# Patient Record
Sex: MALE | Race: BLACK OR AFRICAN AMERICAN | NOT HISPANIC OR LATINO | Employment: OTHER | URBAN - METROPOLITAN AREA
[De-identification: names, ages, dates, MRNs, and addresses within clinical notes are randomized per-mention and may not be internally consistent; named-entity substitution may affect disease eponyms.]

---

## 2023-08-14 ENCOUNTER — HOSPITAL ENCOUNTER (OUTPATIENT)
Dept: DATA CONVERSION | Facility: HOSPITAL | Age: 63
Discharge: HOME | End: 2023-08-14

## 2023-08-14 DIAGNOSIS — M54.50 LOW BACK PAIN, UNSPECIFIED: ICD-10-CM

## 2023-08-28 LAB
INR IN PPP BY COAGULATION ASSAY EXTERNAL: 2.1
PROTHROMBIN TIME (PT) IN PPP BY COAGULATION ASSAY EXTERNAL: NORMAL SECONDS

## 2023-09-27 ENCOUNTER — TRANSCRIBE ORDERS (OUTPATIENT)
Dept: ENDOCRINOLOGY | Facility: CLINIC | Age: 63
End: 2023-09-27
Payer: MEDICARE

## 2023-09-28 DIAGNOSIS — I82.4Y9 DEEP VEIN THROMBOSIS (DVT) OF PROXIMAL LOWER EXTREMITY, UNSPECIFIED CHRONICITY, UNSPECIFIED LATERALITY (MULTI): Primary | ICD-10-CM

## 2023-09-28 PROBLEM — I82.409 DVT OF LEG (DEEP VENOUS THROMBOSIS) (MULTI): Status: ACTIVE | Noted: 2023-09-28

## 2023-10-02 ENCOUNTER — CLINICAL SUPPORT (OUTPATIENT)
Dept: PRIMARY CARE | Facility: HOSPITAL | Age: 63
End: 2023-10-02
Payer: MEDICARE

## 2023-10-02 DIAGNOSIS — I82.4Y9 DEEP VEIN THROMBOSIS (DVT) OF PROXIMAL LOWER EXTREMITY, UNSPECIFIED CHRONICITY, UNSPECIFIED LATERALITY (MULTI): ICD-10-CM

## 2023-10-02 LAB
POC INR: 1.9
POC PROTHROMBIN TIME: NORMAL

## 2023-10-02 PROCEDURE — 99211 OFF/OP EST MAY X REQ PHY/QHP: CPT | Performed by: NURSE PRACTITIONER

## 2023-10-02 PROCEDURE — 85610 PROTHROMBIN TIME: CPT | Performed by: NURSE PRACTITIONER

## 2023-10-02 NOTE — PROGRESS NOTES
Patient identification verified with 2 identifiers.    Location: [facility] - 1st Floor Anticoagulation Clinic [address] [place], [place] [address]    Referring Physician: Dr Koo  Enrollment/ Re-enrollment date: 10/2/24   INR Goal: 2.0-3.0  INR monitoring is per [facility] protocol.  Anticoagulation Medication: warfarin  Indication: DVT    Subjective   Bleeding signs/symptoms: No    Bruising: No   Major bleeding event: No  Thrombosis signs/symptoms: No  Thromboembolic event: No  Missed doses: No  Extra doses: No  Medication changes: No  Dietary changes: No  Change in health: No  Change in activity: No  Alcohol: No  Other concerns: No    Upcoming Surgeries:  Does the Patient Have any upcoming surgeries that require interruption in anticoagulation therapy? no  Does the patient require bridging? no      Anticoagulation Summary  As of 10/2/2023      INR goal:  2.0-3.0   TTR:  --   INR used for dosin.90 (10/2/2023)   Weekly warfarin total:  38 mg               Assessment/Plan   Subtherapeutic     1. New dose: no change    2. Next INR: 1 month  Patient will take 6 mg (one time dose) on Friday since he thinks he did not take  dose correctly      Education provided to patient during the visit:  Patient instructed to call in interim with questions, concerns and changes.

## 2023-10-24 ENCOUNTER — NUTRITION (OUTPATIENT)
Dept: PRIMARY CARE | Facility: CLINIC | Age: 63
End: 2023-10-24
Payer: MEDICARE

## 2023-10-24 DIAGNOSIS — E66.01 CLASS 3 SEVERE OBESITY DUE TO EXCESS CALORIES IN ADULT, UNSPECIFIED BMI, UNSPECIFIED WHETHER SERIOUS COMORBIDITY PRESENT (MULTI): Primary | ICD-10-CM

## 2023-10-24 PROCEDURE — 97802 MEDICAL NUTRITION INDIV IN: CPT

## 2023-10-24 NOTE — PROGRESS NOTES
"Nutrition: Initial Assessment    Reason for Nutrition Visit: Patient is a 63 y.o. male referred for wt mgmt. Referred on 10/2/23 by Dr. Albright.     Subjective: Would like to feel more mobile and less fatigued. He is on Warfarin. In the past, he lost wt w/ [employer]. Says he frequently skips meals.    Food and Nutrition History  Allergies: None  Intolerance: None  Appetite: Fluctuates  GI Symptoms : None  Swallowing Difficulty: No problems with swallowing  Dentition : own  Food Preparation: Patient and Partner/Spouse  Cooking Skills/Barriers: None reported  Grocery Shopping: Patient  Supplements: Denies   Sleep disorders: obstructive sleep apnea, no CPAP  Food Insecurity: Denies    24 Hour Diet Recall  Meal 1: hotdog, grits  Meal 2: piece of chicken, handful of chips, water  Meal 3: polish boy on Sunday night     Snacks: smoothies w/ protein, chips   Beverages: water, lemonade, occ pop   Eating out: His wife picks up fast food on her way home from work.   Alcohol Intake: occ beer    Physical Activity: No formal exercise; mowing lawn and chores     Anthropometrics  Ht Readings from Last 1 Encounters:   08/11/23 1.727 m (5' 8\")     BMI Readings from Last 1 Encounters:   08/11/23 50.51 kg/m²     Wt Readings from Last 10 Encounters:   08/11/23 (!) 151 kg (332 lb 3.2 oz)   06/23/23 150 kg (330 lb)   05/04/23 149 kg (329 lb)   12/15/22 147 kg (325 lb)   10/06/22 143 kg (315 lb)   09/14/22 (!) 141 kg (310 lb)   08/15/22 (!) 141 kg (311 lb)   07/05/22 142 kg (313 lb 6.4 oz)   04/26/22 138 kg (304 lb)   02/15/22 130 kg (286 lb)     Weight Changes: Wt gain of 5% (17#) x 1 year    Labs  Low vitamin D = 19 (6/26/23)     Nutrition Focused Physical Exam:  Performed/Deferred: Deferred as pt visually appears well-nourished with no signs of malnutrition    Past Medical History  Patient Active Problem List   Diagnosis    DVT of leg (deep venous thrombosis) (CMS/[facility])        Estimated Nutrition Needs  Estimated Energy Needs: " 1800 kcals/day  Method for Calculating: MSJ 2121 x 1.1 - 500 (for weight loss)    Estimated Protein Needs: 90 grams/day  Method for Calculatin.6 g/kg/d    Nutrition Diagnosis:  Diagnosis Statement 1:  Diagnosis Status: New  Diagnosis : Undesirable food choices related to patient's uncertainty of what foods to be eating for a balanced diet as evidenced by  high intake of processed foods and high sodium foods and low intake of fruits/veg, whole grains, lean proteins.       Nutrition Interventions:  Food/Nutrient Delivery: Decreased Sodium Diet and Mediterranean Diet  Nutrition Counseling: Goal Setting  Coordination of Care: Patient would like to see Sleep Medicine; Will contact Dr. Albright for referral.   Nutrition Education:   Discussed starting an interdisciplinary weight management program that occurs in a small group setting and includes dietary interventions, physical activity discussions, and goal setting.  Discussed incorporating more fiber in his diet through whole food sources; Recommended increasing non-starchy vegetables and reviewed sources. Discussed limiting sources high in vitamin K.  Provided education on benefits of walking / light physical activity.   Recommended patient start OTC vitamin D3 5000 UT once daily from a USP certified brand.   *Patient voiced understanding of the education provided and denied any additional questions/concerns.    Educational Handouts: Mediterranean Meal Plan Booklet    Nutrition Goals:  Consistent meal / snack pattern  Vitamin D levels WNL  Intentional weight loss of 0.5-2 lb per week, trending toward a clinically significant weight loss of 5-10% of current body weight       S.M.A.R.T. Goals:   Aim to walk 10 minutes per day, 5-6 days per week.   Aim to eat 1 serving of a non-starchy vegetable 5-6 days per week.     Readiness to Change : Excellent  Level of Understanding : Excellent  Anticipated Compliant : Excellent      Follow-up: Patient is welcome to  follow-up at any time. To schedule, please call [phone].

## 2023-10-31 ENCOUNTER — OFFICE VISIT (OUTPATIENT)
Dept: SLEEP MEDICINE | Facility: CLINIC | Age: 63
End: 2023-10-31
Payer: MEDICARE

## 2023-10-31 VITALS
SYSTOLIC BLOOD PRESSURE: 104 MMHG | HEART RATE: 81 BPM | DIASTOLIC BLOOD PRESSURE: 82 MMHG | OXYGEN SATURATION: 97 % | WEIGHT: 315 LBS | BODY MASS INDEX: 47.74 KG/M2 | HEIGHT: 68 IN

## 2023-10-31 DIAGNOSIS — E66.01 CLASS 3 SEVERE OBESITY DUE TO EXCESS CALORIES IN ADULT, UNSPECIFIED BMI, UNSPECIFIED WHETHER SERIOUS COMORBIDITY PRESENT (MULTI): ICD-10-CM

## 2023-10-31 DIAGNOSIS — G47.33 OSA (OBSTRUCTIVE SLEEP APNEA): Primary | ICD-10-CM

## 2023-10-31 PROCEDURE — 3008F BODY MASS INDEX DOCD: CPT | Performed by: PHYSICIAN ASSISTANT

## 2023-10-31 PROCEDURE — 99215 OFFICE O/P EST HI 40 MIN: CPT | Performed by: PHYSICIAN ASSISTANT

## 2023-10-31 PROCEDURE — 1036F TOBACCO NON-USER: CPT | Performed by: PHYSICIAN ASSISTANT

## 2023-10-31 RX ORDER — LISINOPRIL AND HYDROCHLOROTHIAZIDE 12.5; 2 MG/1; MG/1
TABLET ORAL
COMMUNITY
Start: 2017-02-07 | End: 2024-04-17 | Stop reason: WASHOUT

## 2023-10-31 RX ORDER — ASPIRIN 325 MG
1 TABLET, DELAYED RELEASE (ENTERIC COATED) ORAL WEEKLY
COMMUNITY
Start: 2023-07-07 | End: 2023-11-30 | Stop reason: WASHOUT

## 2023-10-31 RX ORDER — FUROSEMIDE 40 MG/1
TABLET ORAL EVERY 24 HOURS
COMMUNITY
Start: 2021-02-08 | End: 2023-11-24 | Stop reason: SDUPTHER

## 2023-10-31 RX ORDER — METOPROLOL TARTRATE 25 MG/1
1 TABLET, FILM COATED ORAL EVERY 12 HOURS
COMMUNITY
Start: 2015-11-30

## 2023-10-31 RX ORDER — TADALAFIL 10 MG/1
TABLET ORAL
COMMUNITY
Start: 2014-10-06 | End: 2023-11-30 | Stop reason: WASHOUT

## 2023-10-31 RX ORDER — LOVASTATIN 20 MG/1
TABLET ORAL EVERY 24 HOURS
COMMUNITY
Start: 2023-06-20 | End: 2023-11-30 | Stop reason: SDUPTHER

## 2023-10-31 RX ORDER — ATENOLOL 25 MG/1
1 TABLET ORAL DAILY
COMMUNITY
End: 2024-01-24 | Stop reason: ALTCHOICE

## 2023-10-31 RX ORDER — AMIODARONE HYDROCHLORIDE 200 MG/1
TABLET ORAL EVERY 24 HOURS
COMMUNITY
Start: 2018-07-02

## 2023-10-31 RX ORDER — WARFARIN 2 MG/1
TABLET ORAL
COMMUNITY

## 2023-10-31 ASSESSMENT — PAIN SCALES - GENERAL: PAINLEVEL: 0-NO PAIN

## 2023-10-31 NOTE — PATIENT INSTRUCTIONS
Thank you for coming to the Sleep Medicine Clinic today! Your sleep medicine provider today was: Denton Holland PA-C Below is a summary of your treatment plan, other important information, and our contact numbers:      TREATMENT PLAN     Oral Appliance Therapy (also known as Mandibular Advancement Device): After talking about several sleep apnea treatment options, we have decided that an oral appliance is a reasonable treatment option. Oral appliance therapy is a dental device that can be fabricated, customized, and adjusted by a dental sleep medicine specialist.  You need to contact a dentist who is comfortable with making oral appliances for sleep apnea. You should consider a dentist who also specialized in sleep medicine. Below are the names of individuals that we commonly refer patients to or are known as dental sleep medicine specialists.    After your oral appliance is made and adjusted (~3-6 months), we would like to make sure that the oral appliance is treating your sleep apnea effectively. Please return to my clinic at that time for follow up and testing.     Dr. Raza Serrato  /[facility]: [phone]    Dr. Tez Devi  [facility]   [phone]    Dr. Nolan Wheatley  [place], [place]: [phone]    Dr. Abelino Gay  [place], [place]: [phone]    Dr. Farhad [place], [place]: [phone]    Dr. Fabio New  [place], [place]: [phone]    [facility]:  Dr. Vega Taylor    You can also check the American Academy of Dental Sleep Medicine for additional recommendation of dentists that make oral appliances at: https://mms.aadsm.org/members/directory/search_bootstrap.php?org_id=ADSM.    Oral appliance therapy is typically covered by your medical insurance as sleep apnea is a medical diagnoses and not a dental diagnosis. You can confirm coverage by calling your medical insurance and providing them with the following medical codes:    Diagnosis code: Obstructive Sleep  "Apnea G47.33  Procedure code: V78841      Follow-up Appointment:   After evaluation for oral advancement device      IMPORTANT PHONE NUMBERS     Sleep Medicine Clinic Fax: [phone]  Appointments (for Adult Sleep Clinic): [phone] - option 2  Appointments (For Sleep Studies): [phone] - option 3  Behavioral Sleep Medicine: [phone]    [facility] ([facility]): [phone]  For clinical questions and refilling prescriptions: [phone]  Manisha Perez (For Melissa/Amalia): P: [phone]  F: [phone]       CONTACTING YOUR SLEEP MEDICINE PROVIDER     Send a message directly to your provider through \"My Chart\", which is the email service through your  Records Account: [email or web address]   Call [phone] and leave a message. One of the administrative assistants will forward the message to your sleep medicine provider through \"My Chart\" and/or email.     Your sleep medicine provider for this visit was: Denton Holland PA-C    "

## 2023-10-31 NOTE — PROGRESS NOTES
"[facility]  Followup Visit Note    HISTORY OF PRESENT ILLNESS   Fernando Nation is a 63 y.o. male who presents to a [facility] for followup.     The patient  has a past medical history of Heart disease, unspecified, Other headache syndrome, Personal history of other diseases of the circulatory system, Personal history of other diseases of the circulatory system, Personal history of other diseases of the digestive system, Personal history of other diseases of the musculoskeletal system and connective tissue, Personal history of other drug therapy, Personal history of other endocrine, nutritional and metabolic disease, Personal history of other endocrine, nutritional and metabolic disease, Personal history of other endocrine, nutritional and metabolic disease, Personal history of other endocrine, nutritional and metabolic disease, Personal history of other specified conditions, Personal history of pneumonia (recurrent), Personal history of pulmonary embolism, and Presence of automatic (implantable) cardiac defibrillator..      Current History    On today's visit, the patient reports he is interested in an oral appliance (mandibular advancement device).     Has not been able to tolerate CPAP/biPAP. He felt with both the CPAP and BiPAP he had palpitations. States it \"forced my heart to beat in a different direction\". He has a defibrillator in his chest.    Even prior to these issues he had difficulty tolerating CPAP. Never really felt it helped significantly.    Symptoms of VENECIA include daytime sleepiness, morning headaches, elevated BP. Snores, stops breathing, HTN.    He is a side sleeper. No caffeine. ESS is 11 today.    PAP Adherence:  Durable Medical Equipment Company: [employer]  No recent use    Sleep Scales:  ESS: No data recorded     REVIEW OF SYSTEMS    All other systems negative      ALLERGIES AND MEDICATIONS     ALLERGIES  No Known " Allergies    MEDICATIONS: He has a current medication list which includes the following prescription(s): amiodarone - once every 24 hours, atenolol - Take 1 tablet (25 mg) by mouth once daily, cholecalciferol - Take 1 capsule (50,000 Units) by mouth once a week, furosemide - once every 24 hours, lisinopril-hydrochlorothiazide - Take by mouth, lovastatin - once every 24 hours, metoprolol tartrate - Take 1 tablet (25 mg) by mouth every 12 hours, tadalafil - Take 1 tablet by mouth prior to sexual activity, and warfarin - as directed po once a day/ except for 6 mg on monday and Fridays.    PAST MEDICAL HISTORY : He  has a past medical history of Heart disease, unspecified, Other headache syndrome, Personal history of other diseases of the circulatory system, Personal history of other diseases of the circulatory system, Personal history of other diseases of the digestive system, Personal history of other diseases of the musculoskeletal system and connective tissue, Personal history of other drug therapy, Personal history of other endocrine, nutritional and metabolic disease, Personal history of other endocrine, nutritional and metabolic disease, Personal history of other endocrine, nutritional and metabolic disease, Personal history of other endocrine, nutritional and metabolic disease, Personal history of other specified conditions, Personal history of pneumonia (recurrent), Personal history of pulmonary embolism, and Presence of automatic (implantable) cardiac defibrillator.    PAST SURGICAL HISTORY: He  has a past surgical history that includes Other surgical history (10/03/2016); Colonoscopy (02/07/2017); Other surgical history (08/15/2022); and Other surgical history (08/15/2022).     FAMILY HISTORY: No changes since previous visit. Otherwise non-contributory as charted.       SOCIAL HISTORY  He  reports that he has never smoked. He has never used smokeless tobacco. He reports that he does not currently use alcohol.  "He reports that he does not use drugs.       PHYSICAL EXAM     VITAL SIGNS: /82   Pulse 81   Ht 1.727 m (5' 8\")   Wt 147 kg (325 lb)   SpO2 97%   BMI 49.42 kg/m²      CURRENT WEIGHT: [unfilled]  BMI: [unfilled]  PREVIOUS WEIGHTS:  Wt Readings from Last 3 Encounters:   10/31/23 147 kg (325 lb)   08/11/23 (!) 151 kg (332 lb 3.2 oz)   06/23/23 150 kg (330 lb)       Constitutional: Alert and oriented, cooperative, no obvious distress   HEENT: Non icteric or anemic, EOM WNL bilaterally   Neck: Supple, no JVD, no goiter, no adenopathy, no rigidity  Extremities: No clubbing, no LL edema   Neuromuscular: Cranial nerves grossly intact, no focal deficits     Mallampati 4+  Bower 4+  macroglossia  Lateral wall 2+    RESULTS/DATA     Iron (UG/DL)   Date Value   12/04/2021 70     Iron Saturation (%)   Date Value   12/04/2021 45.2     TIBC (UG/DL)   Date Value   12/04/2021 155 (L)         ASSESSMENT/PLAN     Mr. Nation is a 63 y.o. male and returns in followup for the following issues:    OBSTRUCTIVE SLEEP APNEA  -Provided list of dentists who perform OAT  -He is not interested in titration study to evaluate ideal PAP settings  -Recommend side sleeping, avoiding supine sleep  -Also discussed weight loss would likely reduce severity of VENECIA and in combination with positional therapy and OAT would likely manage VENECIA well  -Goal of OAT is better BP control, less sleepiness, less waking at night, lower CVD risk    OBESITY  -BMI 49 TODAY  -Has been seeing nutritionist with goal of losing weight  -Understands relationship between weight and VENECIA    HYPERTENSION  -/82 today  -Well controlled with current medications    He will call preferred dentist for further evaluation regarding oral appliance therapy    Follow up after starting OAT         "

## 2023-11-01 ENCOUNTER — OFFICE VISIT (OUTPATIENT)
Dept: ENDOCRINOLOGY | Facility: CLINIC | Age: 63
End: 2023-11-01
Payer: MEDICARE

## 2023-11-01 VITALS
DIASTOLIC BLOOD PRESSURE: 64 MMHG | HEIGHT: 68 IN | HEART RATE: 87 BPM | BODY MASS INDEX: 47.74 KG/M2 | WEIGHT: 315 LBS | TEMPERATURE: 97 F | SYSTOLIC BLOOD PRESSURE: 100 MMHG

## 2023-11-01 DIAGNOSIS — E66.8 CONSTITUTIONAL OBESITY: ICD-10-CM

## 2023-11-01 DIAGNOSIS — Z76.89 ENCOUNTER FOR WEIGHT MANAGEMENT: ICD-10-CM

## 2023-11-01 PROCEDURE — 3008F BODY MASS INDEX DOCD: CPT | Performed by: INTERNAL MEDICINE

## 2023-11-01 PROCEDURE — 99215 OFFICE O/P EST HI 40 MIN: CPT | Performed by: INTERNAL MEDICINE

## 2023-11-01 PROCEDURE — 1036F TOBACCO NON-USER: CPT | Performed by: INTERNAL MEDICINE

## 2023-11-01 NOTE — PROGRESS NOTES
Subjective   Fernando Nation is a 63 y.o. male with a hx of afib, Hypertrophic cardiomyopathy (goes to [facility]), defibillator, VENECIA, HTN, HLD, Vtach who presents for weight management and obesity.  No Glaucoma, (+) remote history of kidney stones.     weight has always been an issue for him.  over the last year he gained 12lb.     1. Nutrition: Has not modified anything in diet. Eating more baked chicken.     2. Sleep: Has VENECIA      3. Stress: Managed      4. Exercise: walked a couple times for 10 minutes. Not currently exercising.      5. Appetite control: controlled  AOM currently taking: No    6. Goal: 10 minute walk every other day       Current Outpatient Medications:     amiodarone (Pacerone) 200 mg tablet, once every 24 hours., Disp: , Rfl:     atenolol (Tenormin) 25 mg tablet, Take 1 tablet (25 mg) by mouth once daily., Disp: , Rfl:     cholecalciferol (Vitamin D-3) 50,000 unit capsule, Take 1 capsule (50,000 Units) by mouth once a week., Disp: , Rfl:     furosemide (Lasix) 40 mg tablet, once every 24 hours., Disp: , Rfl:     lisinopriL-hydrochlorothiazide 20-12.5 mg tablet, Take by mouth., Disp: , Rfl:     lovastatin (Mevacor) 20 mg tablet, once every 24 hours., Disp: , Rfl:     metoprolol tartrate (Lopressor) 25 mg tablet, Take 1 tablet (25 mg) by mouth every 12 hours., Disp: , Rfl:     tadalafil (Cialis) 10 mg tablet, Take 1 tablet by mouth prior to sexual activity, Disp: , Rfl:     warfarin (Coumadin) 2 mg tablet, as directed po once a day/ except for 6 mg on monday and Fridays, Disp: , Rfl:     ROS:  System: normal  Eyes : no visual changes  Neck : no tenderness, no new lumps/bumps  Respiratory : no SOB  Cardiovascular : no chest pain, no palpitations  Gastro-Intestinal : no abdominal concerns  Neurological : no numbness or tingling in the extremities  Musculoskeletal : no joint paint, no muscle pain  Skin : no unusual rashes  Psychiatric : no depression, no anxiety  See HPI for Endocrine ROS    Past Medical  History:   Diagnosis Date    Heart disease, unspecified     Systolic dysfunction    Other headache syndrome     Headache syndrome    Personal history of other diseases of the circulatory system     History of cardiac disorder    Personal history of other diseases of the circulatory system     History of hypertension    Personal history of other diseases of the digestive system     History of gastroesophageal reflux (GERD)    Personal history of other diseases of the musculoskeletal system and connective tissue     History of gout    Personal history of other drug therapy     History of anticoagulant therapy    Personal history of other endocrine, nutritional and metabolic disease     History of hyperlipidemia    Personal history of other endocrine, nutritional and metabolic disease     History of vitamin D deficiency    Personal history of other endocrine, nutritional and metabolic disease     History of hyperthyroidism    Personal history of other endocrine, nutritional and metabolic disease     History of thyroid nodule    Personal history of other specified conditions     History of urinary hesitancy    Personal history of pneumonia (recurrent)     History of pneumonia    Personal history of pulmonary embolism     History of pulmonary embolism    Presence of automatic (implantable) cardiac defibrillator     Cardiac defibrillator in place       Past Surgical History:   Procedure Laterality Date    COLONOSCOPY  02/07/2017    Complete Colonoscopy    OTHER SURGICAL HISTORY  10/03/2016    Cardio-defib Pulse Generator Implantation Date    OTHER SURGICAL HISTORY  08/15/2022    Hernia repair    OTHER SURGICAL HISTORY  08/15/2022    Colonoscopy       Social History     Socioeconomic History    Marital status:      Spouse name: Not on file    Number of children: Not on file    Years of education: Not on file    Highest education level: Not on file   Occupational History    Not on file   Tobacco Use    Smoking  status: Never    Smokeless tobacco: Never   Substance and Sexual Activity    Alcohol use: Not Currently    Drug use: Never    Sexual activity: Not on file   Other Topics Concern    Not on file   Social History Narrative    Not on file     Social Determinants of Health     Financial Resource Strain: Not on file   Food Insecurity: Not on file   Transportation Needs: Not on file   Physical Activity: Not on file   Stress: Not on file   Social Connections: Not on file   Intimate Partner Violence: Not on file   Housing Stability: Not on file       Objective    Physical Exam:  There were no vitals taken for this visit.  General : alert and oriented X3, no acute distress  Eyes : EOMI     Assessment/Plan   Fernando Nation is a 63 y.o. male with a hx of afib, Hypertrophic cardiomyopathy (goes to [facility]), defibillator, VENECIA, HTN, HLD, Vtach who presents for weight management and obesity.  No Glaucoma, (+) remote history of kidney stones.    1. Weight Management : Reviewed the principles of energy metabolism, caloric intake and expenditure, and rationale for a treatment program.  Also reinforced need for reduced calorie, low fat diet and increased physical activity.    We reviewed the possibility of starting an interdisciplinary lifestyle intervention program involving improvement of the diet, a personalized exercise program, efforts to reduce the stress and the possibility of using appetite suppressant medications in an effort to help with the weight loss process.  The patient expressed interest in the plan.    2. Nutrition : I discussed trying one of the diet approaches we have here in the program : Mediterranean lifestyle, ketosis diet.  The patient will be referred to the Registered Dietician for education on their diet of choice.  The dietary booklet was provided in the visit today.    9/26/23 : 332lb, 50.48kg/m2  11/01/23: 339.9lb, 51.68kg/m2    3. Sleep : has VENECIA - being fitted for a mouth mask.    4. Stress : managed    5.  Exercise : minimal.  Cecilia encouraged he go for a 10 min walk.    6. Appetite : high.  discussed the AOM's  I told him I would avoid phentermine - because of his heart.  consider: topamax (did have a very remote h/o one kidney stone), contrave, plenity.  he has Prediabetes : but did discuss the GLP-1 and asked he look into his insurance.  --not on anything now.    7. Goal : to walk 10min every other day.  Will get him another visit with Cecilia and asked he bring his wife.     EDMUNDO in a group visit.  Rei Mckeon MD

## 2023-11-06 ENCOUNTER — ANTICOAGULATION - WARFARIN VISIT (OUTPATIENT)
Dept: CARDIOLOGY | Facility: HOSPITAL | Age: 63
End: 2023-11-06
Payer: MEDICARE

## 2023-11-06 DIAGNOSIS — I82.4Y9 DEEP VEIN THROMBOSIS (DVT) OF PROXIMAL LOWER EXTREMITY, UNSPECIFIED CHRONICITY, UNSPECIFIED LATERALITY (MULTI): Primary | ICD-10-CM

## 2023-11-06 LAB
POC INR: 2.3
POC PROTHROMBIN TIME: NORMAL

## 2023-11-06 PROCEDURE — 99211 OFF/OP EST MAY X REQ PHY/QHP: CPT | Performed by: FAMILY MEDICINE

## 2023-11-06 NOTE — PROGRESS NOTES
Patient identification verified with 2 identifiers.    Location: [facility] - 1st Floor Anticoagulation Clinic [address] [id number]    Referring Physician: DR hilario  Enrollment/ Re-enrollment date:    INR Goal: 2.0-3.0  INR monitoring is per [facility] protocol.  Anticoagulation Medication: warfarin  Indication: Deep Vein Thrombosis (DVT)    Subjective   Bleeding signs/symptoms: No    Bruising: No   Major bleeding event: No  Thrombosis signs/symptoms: No  Thromboembolic event: No  Missed doses: No  Extra doses: No  Medication changes: No  Dietary changes: No  Change in health: No  Change in activity: No  Alcohol: No  Other concerns: No    Upcoming Surgeries:  Does the Patient Have any upcoming surgeries that require interruption in anticoagulation therapy? no  Does the patient require bridging? no      Anticoagulation Summary  As of 2023      INR goal:  2.0-3.0   TTR:  90.5 % (4.1 wk)   INR used for dosin.30 (2023)   Weekly warfarin total:  38 mg               Assessment/Plan   Therapeutic     1. New dose: no change    2. Next INR: 1 month      Education provided to patient during the visit:  Patient instructed to call in interim with questions, concerns and changes.

## 2023-11-27 PROBLEM — I50.32 CHRONIC DIASTOLIC HEART FAILURE (MULTI): Status: ACTIVE | Noted: 2023-11-27

## 2023-11-29 ASSESSMENT — ENCOUNTER SYMPTOMS
SORE THROAT: 0
CHILLS: 0
HEARTBURN: 0
WHEEZING: 0
HEMOPTYSIS: 0
WEIGHT LOSS: 0
RHINORRHEA: 0
FEVER: 0
COUGH: 1
SWEATS: 0
HEADACHES: 1
MYALGIAS: 0
SHORTNESS OF BREATH: 1

## 2023-11-30 ENCOUNTER — TELEMEDICINE (OUTPATIENT)
Dept: PRIMARY CARE | Facility: CLINIC | Age: 63
End: 2023-11-30
Payer: MEDICARE

## 2023-11-30 DIAGNOSIS — G47.33 OBSTRUCTIVE SLEEP APNEA OF ADULT: ICD-10-CM

## 2023-11-30 DIAGNOSIS — I42.2 CARDIOMYOPATHY, HYPERTROPHIC NONOBSTRUCTIVE (MULTI): ICD-10-CM

## 2023-11-30 DIAGNOSIS — J06.9 URI, ACUTE: Primary | ICD-10-CM

## 2023-11-30 DIAGNOSIS — L30.4 INTERTRIGO: ICD-10-CM

## 2023-11-30 PROCEDURE — 99214 OFFICE O/P EST MOD 30 MIN: CPT | Performed by: FAMILY MEDICINE

## 2023-11-30 RX ORDER — BENZONATATE 100 MG/1
100 CAPSULE ORAL 3 TIMES DAILY PRN
Qty: 30 CAPSULE | Refills: 0 | Status: SHIPPED | OUTPATIENT
Start: 2023-11-30 | End: 2023-12-10

## 2023-11-30 RX ORDER — MUPIROCIN CALCIUM 20 MG/G
CREAM TOPICAL 3 TIMES DAILY
Qty: 30 G | Refills: 0 | Status: SHIPPED | OUTPATIENT
Start: 2023-11-30 | End: 2023-12-10

## 2023-11-30 RX ORDER — LOVASTATIN 20 MG/1
20 TABLET ORAL NIGHTLY
Qty: 90 TABLET | Refills: 1 | Status: SHIPPED | OUTPATIENT
Start: 2023-11-30 | End: 2023-11-30 | Stop reason: SDUPTHER

## 2023-11-30 RX ORDER — LOVASTATIN 20 MG/1
20 TABLET ORAL NIGHTLY
Qty: 90 TABLET | Refills: 1 | Status: SHIPPED | OUTPATIENT
Start: 2023-11-30 | End: 2024-05-28

## 2023-11-30 RX ORDER — WARFARIN 6 MG/1
6 TABLET ORAL
COMMUNITY

## 2023-11-30 RX ORDER — AZITHROMYCIN 250 MG/1
TABLET, FILM COATED ORAL
Qty: 6 TABLET | Refills: 0 | Status: SHIPPED | OUTPATIENT
Start: 2023-11-30 | End: 2023-12-05

## 2023-11-30 ASSESSMENT — LIFESTYLE VARIABLES
HOW OFTEN DO YOU HAVE SIX OR MORE DRINKS ON ONE OCCASION: NEVER
HOW OFTEN DO YOU HAVE A DRINK CONTAINING ALCOHOL: MONTHLY OR LESS
HOW MANY STANDARD DRINKS CONTAINING ALCOHOL DO YOU HAVE ON A TYPICAL DAY: PATIENT DOES NOT DRINK
AUDIT-C TOTAL SCORE: 1
SKIP TO QUESTIONS 9-10: 1

## 2023-11-30 ASSESSMENT — PAIN SCALES - GENERAL: PAINLEVEL: 0-NO PAIN

## 2023-11-30 ASSESSMENT — PATIENT HEALTH QUESTIONNAIRE - PHQ9
SUM OF ALL RESPONSES TO PHQ9 QUESTIONS 1 & 2: 0
1. LITTLE INTEREST OR PLEASURE IN DOING THINGS: NOT AT ALL
2. FEELING DOWN, DEPRESSED OR HOPELESS: NOT AT ALL

## 2023-11-30 NOTE — ASSESSMENT & PLAN NOTE
- Skin irritation seems likely related to breakdown of skin near skin folds  -Recommend keeping area clean and dry and applying antibiotic ointment with prescription sent to pharmacy  -As the area does appear to get routinely irritated, can attempt Epson soaks with plans to monitor for any pain, swelling or worsening discharge

## 2023-11-30 NOTE — ASSESSMENT & PLAN NOTE
- ENT referral placed with plans to have assessment for potential oral device that may help with underlying sleep apnea  -It may be additionally beneficial to contact established dentist to see if there are any products/recommendations  -Going forward, repeat sleep study may ultimately be needed though we can see if any products can be coordinated

## 2023-11-30 NOTE — PROGRESS NOTES
Outpatient Visit Note    Chief Complaint   Patient presents with    Cough     [phone] - x1 week. Tried cough medicine with relief       With patient's permission, this is a Telemedicine visit with video and audio. The provider and patient participated in this telemedicine encounter.    HPI:  Fernando Nation is a 63 y.o. male with PMH significant for CAD/cardiomyopathy and atrial fibrillation with implanted defibrillator, gout, hypertension, hyperlipidemia, hypothyroidism and history of prior demyelinating/Guillain-Uxbridge type syndrome who presents to the office via telemedicine encounter secondary to complaints of cough.  He additionally presents for medication follow-up.  He was last seen in the office on 8/11/2023 secondary to complaints of low back pain.    Last panel blood work completed in June 2023 including CBC, CMP, lipid panel, PSA, TSH and vitamin-D level which was remarkable for mild leukopenia and vitamin-D deficiency. Supplementation regimen was initiated.           Today he reports intermittent nonproductive cough for approximately 1 week.  Has had associated headaches and shortness of breath with coughing spells.  States his symptoms are worse when laying down.  Does note that symptoms have been slightly improving using OTC cough medicine    In interval, patient did have consultation with endocrine/medical weight loss team on 11/1/2023 to which extensive lifestyle efforts were recommended.  He reports continued struggles with his weight, which dramatically limits his physical activity.  Does express hopes of obtaining medication that will assist in weight loss efforts.  Medical weight loss team additionally recommended patient have follow-up assessment regarding sleep apnea.  He did have consultation with sleep medicine specialist who recommended repeat sleep study though this was going to be too expensive.  States they have had prior sleep studies in the past with oral device used to help  keep airway open.  Would be interested in having consultation with specialist that can coordinate use of some similar oral product.    Lastly he admits to recurrent skin irritation on his posterior thigh.  Admits to noted serosanguineous/bleeding occasionally after sitting for periods of time.  Denies any palpable skin lesion.    CAD/atrial fibrillation:  Patient admits to an established relationship with [facility]. Does have continuous information that is reported from his implanted defibrillator. Denies any current chest pain, shortness a breath, lightheadedness, dizziness or vision changes. Has been compliant with medication regimen with no reported adverse side effects.        Current Medications  Current Outpatient Medications   Medication Instructions    amiodarone (Pacerone) 200 mg tablet Every 24 hours    atenolol (Tenormin) 25 mg tablet 1 tablet, oral, Daily    azithromycin (Zithromax) 250 mg tablet Take 2 tablets (500 mg) by mouth once daily for 1 day, THEN 1 tablet (250 mg) once daily for 4 days. Take 2 tabs (500 mg) by mouth today, than 1 daily for 4 days..    furosemide (LASIX) 40 mg, oral, Daily    lisinopriL-hydrochlorothiazide 20-12.5 mg tablet oral    lovastatin (MEVACOR) 20 mg, oral, Nightly    metoprolol tartrate (Lopressor) 25 mg tablet 1 tablet, oral, Every 12 hours    mupirocin (Bactroban) 2 % cream Topical, 3 times daily, apply to affected area    NON FORMULARY HANDICAPPED PARKING PLACARD (5 YR) 5 YEARS<BR>    warfarin (Coumadin) 2 mg tablet Sun and thrus 2 mg x2 tabs    warfarin (COUMADIN) 6 mg, oral, 5 times weekly        Allergies  No Known Allergies     Past Medical History:   Diagnosis Date    Heart disease, unspecified     Systolic dysfunction    Other headache syndrome     Headache syndrome    Personal history of other diseases of the circulatory system     History of cardiac disorder    Personal history of other diseases of the circulatory system     History of hypertension     Personal history of other diseases of the digestive system     History of gastroesophageal reflux (GERD)    Personal history of other diseases of the musculoskeletal system and connective tissue     History of gout    Personal history of other drug therapy     History of anticoagulant therapy    Personal history of other endocrine, nutritional and metabolic disease     History of hyperlipidemia    Personal history of other endocrine, nutritional and metabolic disease     History of vitamin D deficiency    Personal history of other endocrine, nutritional and metabolic disease     History of hyperthyroidism    Personal history of other endocrine, nutritional and metabolic disease     History of thyroid nodule    Personal history of other specified conditions     History of urinary hesitancy    Personal history of pneumonia (recurrent)     History of pneumonia    Personal history of pulmonary embolism     History of pulmonary embolism    Presence of automatic (implantable) cardiac defibrillator     Cardiac defibrillator in place      Past Surgical History:   Procedure Laterality Date    COLONOSCOPY  02/07/2017    Complete Colonoscopy    OTHER SURGICAL HISTORY  10/03/2016    Cardio-defib Pulse Generator Implantation Date    OTHER SURGICAL HISTORY  08/15/2022    Hernia repair    OTHER SURGICAL HISTORY  08/15/2022    Colonoscopy     No family history on file.  Social History     Tobacco Use    Smoking status: Never    Smokeless tobacco: Never   Vaping Use    Vaping Use: Never used   Substance Use Topics    Alcohol use: Not Currently    Drug use: Never     Tobacco Use: Low Risk  (11/30/2023)    Patient History     Smoking Tobacco Use: Never     Smokeless Tobacco Use: Never     Passive Exposure: Not on file        ROS  All pertinent positive symptoms are included in the history of present illness.  All other systems have been reviewed and are negative and noncontributory to this patient's current ailments.    VITAL  SIGNS  There were no vitals filed for this visit.   There is no height or weight on file to calculate BMI.   Patient is unable to provide    PHYSICAL EXAM  GENERAL APPEARANCE:  Alert and oriented x 3, Pleasant and cooperative, No acute distress.   LUNGS:  No conversational dyspnea or cough during encounter.   PSYCH:  appropriate mood and affect, no difficulty with speech.   Telemedicine visit, no other exam component done.      Assessment/Plan   Problem List Items Addressed This Visit             ICD-10-CM    Cardiomyopathy, hypertrophic nonobstructive (CMS/HCC) I42.2    Relevant Medications    lovastatin (Mevacor) 20 mg tablet    URI, acute - Primary J06.9    Relevant Medications    azithromycin (Zithromax) 250 mg tablet    Obstructive sleep apnea of adult G47.33    Relevant Orders    Referral to ENT    Intertrigo L30.4    Relevant Medications    mupirocin (Bactroban) 2 % cream

## 2023-11-30 NOTE — ASSESSMENT & PLAN NOTE
>>ASSESSMENT AND PLAN FOR CARDIOMYOPATHY, HYPERTROPHIC NONOBSTRUCTIVE (MULTI) WRITTEN ON 11/30/2023  4:13 PM BY YANIQUE JAMES,     - Prescription refill sent on cholesterol medication per request  -Will continue on current regimen without modification along with routine establish cardiology follow-up per protocol

## 2023-11-30 NOTE — PATIENT INSTRUCTIONS
Problem List Items Addressed This Visit             ICD-10-CM    Cardiomyopathy, hypertrophic nonobstructive (CMS/HCC) I42.2    Relevant Medications    lovastatin (Mevacor) 20 mg tablet    URI, acute - Primary J06.9    Relevant Medications    azithromycin (Zithromax) 250 mg tablet    Obstructive sleep apnea of adult G47.33    Relevant Orders    Referral to ENT    Intertrigo L30.4    Relevant Medications    mupirocin (Bactroban) 2 % cream

## 2023-11-30 NOTE — ASSESSMENT & PLAN NOTE
- Prescription refill sent on cholesterol medication per request  -Will continue on current regimen without modification along with routine establish cardiology follow-up per protocol

## 2023-11-30 NOTE — ASSESSMENT & PLAN NOTE
- Symptoms likely secondary to an underlying viral illness along with irritation from changing weather patterns  -Continue with OTC cough medication along with Tessalon Perles with prescription sent to pharmacy  -If symptoms persist can utilize azithromycin Z-Jose to which I have sent prescription  - Recommend supportive care with increased fluid intake to thin secretions, Ibuprofen or Tylenol as needed for pain or fever, and steamy showers/saline nasal rinses to help clear the nasal passages   - You may consider tea with honey or a cinnamon stick as these have natural antiviral and antibiotic properties   - Call if symptoms worsen or do not improve with these treatments

## 2023-12-05 ENCOUNTER — ANTICOAGULATION - WARFARIN VISIT (OUTPATIENT)
Dept: CARDIOLOGY | Facility: HOSPITAL | Age: 63
End: 2023-12-05
Payer: MEDICARE

## 2023-12-05 DIAGNOSIS — I82.4Y9 DEEP VEIN THROMBOSIS (DVT) OF PROXIMAL LOWER EXTREMITY, UNSPECIFIED CHRONICITY, UNSPECIFIED LATERALITY (MULTI): Primary | ICD-10-CM

## 2023-12-18 ENCOUNTER — ANTICOAGULATION - WARFARIN VISIT (OUTPATIENT)
Dept: CARDIOLOGY | Facility: HOSPITAL | Age: 63
End: 2023-12-18
Payer: MEDICARE

## 2023-12-18 DIAGNOSIS — I82.4Y9 DEEP VEIN THROMBOSIS (DVT) OF PROXIMAL LOWER EXTREMITY, UNSPECIFIED CHRONICITY, UNSPECIFIED LATERALITY (MULTI): Primary | ICD-10-CM

## 2023-12-18 LAB
POC INR: 2
POC PROTHROMBIN TIME: NORMAL

## 2023-12-18 PROCEDURE — 99211 OFF/OP EST MAY X REQ PHY/QHP: CPT | Performed by: FAMILY MEDICINE

## 2023-12-18 NOTE — PROGRESS NOTES
Patient identification verified with 2 identifiers.    Location: [facility] - 1st Floor Anticoagulation Clinic [address] [address]    Referring Physician: Dr. Koo  Enrollment/ Re-enrollment date: 2024   INR Goal: 2.0-3.0  INR monitoring is per [facility] protocol.  Anticoagulation Medication: warfarin  Indication: Atrial Fibrillation/Atrial Flutter    Subjective   Bleeding signs/symptoms: No    Bruising: No   Major bleeding event: No  Thrombosis signs/symptoms: No  Thromboembolic event: No  Missed doses: No  Extra doses: No  Medication changes: Yes  was on Azithromycin for a cough  Dietary changes: No  Change in health: No  Change in activity: No  Alcohol: No  Other concerns: No    Upcoming Surgeries:  Does the Patient Have any upcoming surgeries that require interruption in anticoagulation therapy? no  Does the patient require bridging? no      Anticoagulation Summary  As of 12/18/2023      INR goal:  2.0-3.0   TTR:  90.5 % (4.1 wk)   INR used for dosing:     Weekly warfarin total:  38 mg               Assessment/Plan   Therapeutic     1. New dose: no change    2. Next INR: 1 month      Education provided to patient during the visit:  Patient instructed to call in interim with questions, concerns and changes.

## 2024-01-16 ENCOUNTER — OFFICE VISIT (OUTPATIENT)
Dept: OTOLARYNGOLOGY | Facility: CLINIC | Age: 64
End: 2024-01-16
Payer: MEDICARE

## 2024-01-16 VITALS — HEIGHT: 68 IN | WEIGHT: 315 LBS | BODY MASS INDEX: 47.74 KG/M2

## 2024-01-16 DIAGNOSIS — J34.2 DEVIATED NASAL SEPTUM: ICD-10-CM

## 2024-01-16 DIAGNOSIS — G47.33 OBSTRUCTIVE SLEEP APNEA: ICD-10-CM

## 2024-01-16 DIAGNOSIS — J30.89 NON-SEASONAL ALLERGIC RHINITIS, UNSPECIFIED TRIGGER: Primary | ICD-10-CM

## 2024-01-16 DIAGNOSIS — J34.3 HYPERTROPHY OF NASAL TURBINATES: ICD-10-CM

## 2024-01-16 PROCEDURE — 3008F BODY MASS INDEX DOCD: CPT | Performed by: OTOLARYNGOLOGY

## 2024-01-16 PROCEDURE — 99204 OFFICE O/P NEW MOD 45 MIN: CPT | Performed by: OTOLARYNGOLOGY

## 2024-01-16 PROCEDURE — 1036F TOBACCO NON-USER: CPT | Performed by: OTOLARYNGOLOGY

## 2024-01-16 RX ORDER — AZELASTINE 1 MG/ML
2 SPRAY, METERED NASAL 2 TIMES DAILY
Qty: 90 ML | Refills: 0 | Status: SHIPPED | OUTPATIENT
Start: 2024-01-16 | End: 2024-01-24 | Stop reason: ALTCHOICE

## 2024-01-16 RX ORDER — FLUTICASONE PROPIONATE 50 MCG
2 SPRAY, SUSPENSION (ML) NASAL DAILY
Qty: 48 ML | Refills: 0 | Status: SHIPPED | OUTPATIENT
Start: 2024-01-16 | End: 2024-01-24 | Stop reason: ALTCHOICE

## 2024-01-16 ASSESSMENT — ENCOUNTER SYMPTOMS
FATIGUE: 1
HEADACHES: 1
SHORTNESS OF BREATH: 1

## 2024-01-16 NOTE — PROGRESS NOTES
Subjective   Patient ID: Fernando Nation is a 63 y.o. male  HPI  Patient has a chronic history of obstructive sleep apnea.  He has tried CPAP but he is unable to tolerate the CPAP due to side effects leading to triggering of his cardiac pacemaker.  He has had some weight gain recently.  He does complain of some nasal congestion mostly on the left side.  He has no purulence from his nose and no facial pain.  He has no fevers or chills or nausea or vomiting.    Review of Systems   Constitutional:  Positive for fatigue.   HENT:          Snoring, scratchy throat   Respiratory:  Positive for shortness of breath.    Cardiovascular:         Irregular heartbeat   Neurological:  Positive for headaches.       Objective   Physical Exam  The following elements of a brief ear nose and throat exam were performed: External ear canals and tympanic membranes, external nose and nasal passages, oral cavity, palpation of the neck, percussion of the face, palpation of the thyroid.    There is nasal edema and the turbinates are pale.  There is a deviation of the septum to the left side and inferior turbinates are enlarged.  There is elevation of the BMI noted at 49.57.  There is elevation of the base of tongue and he has a low palate arch.  The remainder of his exam was within normal limits.    Assessment/Plan   Diagnoses and all orders for this visit:  Non-seasonal allergic rhinitis, unspecified trigger (Primary)  -     fluticasone (Flonase) 50 mcg/actuation nasal spray; Administer 2 sprays into each nostril once daily. Shake gently. Before first use, prime pump. After use, clean tip and replace cap.  -     azelastine (Astelin) 137 mcg (0.1 %) nasal spray; Administer 2 sprays into each nostril 2 times a day.  Deviated nasal septum  Hypertrophy of nasal turbinates  Obstructive sleep apnea  -     Referral to ENT; Future  BMI 45.0-49.9, adult (CMS/[facility])     1.  Chronic obstructive sleep apnea with intolerance of CPAP.  The patient was  referred [facility] [place] sleep apnea clinic for consideration of surgical intervention.  2.  Chronic allergic rhinitis with mild deviation of the septum and hypertrophy of the turbinates contributing to the sleep apnea.  The patient was started on Flonase and Astelin nasal sprays.  3.  Severe elevation of BMI.  The patient was advised to pursue weight reduction.

## 2024-01-23 NOTE — PROGRESS NOTES
Subjective   Fernando Nation is a 63 y.o. male with a hx of afib, Hypertrophic cardiomyopathy (goes to [facility]), defibillator, VENECIA, HTN, HLD, Vtach who presents for weight management and obesity.  No Glaucoma, (+) remote history of kidney stones.     weight has always been an issue for him.  over the last year he gained 12lb.     1. Nutrition: Has not modified anything in diet.      2. Sleep: Has VENECIA       3. Stress: Managed       4. Exercise: walking everyday, advised to strength train      5. Appetite control: controlled  AOM currently taking: No     6. Goal: increase walking distance       Current Outpatient Medications:     amiodarone (Pacerone) 200 mg tablet, once every 24 hours., Disp: , Rfl:     atenolol (Tenormin) 25 mg tablet, Take 1 tablet (25 mg) by mouth once daily., Disp: , Rfl:     azelastine (Astelin) 137 mcg (0.1 %) nasal spray, Administer 2 sprays into each nostril 2 times a day., Disp: 90 mL, Rfl: 0    fluticasone (Flonase) 50 mcg/actuation nasal spray, Administer 2 sprays into each nostril once daily. Shake gently. Before first use, prime pump. After use, clean tip and replace cap., Disp: 48 mL, Rfl: 0    furosemide (Lasix) 40 mg tablet, Take 1 tablet (40 mg) by mouth once daily. (Patient taking differently: Take 1 tablet (40 mg) by mouth 2 times a day.), Disp: 90 tablet, Rfl: 0    lisinopriL-hydrochlorothiazide 20-12.5 mg tablet, Take by mouth., Disp: , Rfl:     lovastatin (Mevacor) 20 mg tablet, Take 1 tablet (20 mg) by mouth once daily at bedtime., Disp: 90 tablet, Rfl: 1    metoprolol tartrate (Lopressor) 25 mg tablet, Take 1 tablet (25 mg) by mouth every 12 hours., Disp: , Rfl:     NON FORMULARY, HANDICAPPED PARKING PLACARD (5 YR) 5 YEARS, Disp: , Rfl:     warfarin (Coumadin) 2 mg tablet, Sun and thrus 2 mg x2 tabs, Disp: , Rfl:     warfarin (Coumadin) 6 mg tablet, Take 1 tablet (6 mg) by mouth 5 times a week., Disp: , Rfl:     ROS:  System: normal  Eyes : no visual changes  Neck : no tenderness,  no new lumps/bumps  Respiratory : no SOB  Cardiovascular : no chest pain, no palpitations  Gastro-Intestinal : no abdominal concerns  Neurological : no numbness or tingling in the extremities  Musculoskeletal : no joint paint, no muscle pain  Skin : no unusual rashes  Psychiatric : no depression, no anxiety  See HPI for Endocrine ROS    Past Medical History:   Diagnosis Date    Asthma     Blood vessel disorder     Heart disease, unspecified     Systolic dysfunction    Other headache syndrome     Headache syndrome    Personal history of other diseases of the circulatory system     History of cardiac disorder    Personal history of other diseases of the circulatory system     History of hypertension    Personal history of other diseases of the digestive system     History of gastroesophageal reflux (GERD)    Personal history of other diseases of the musculoskeletal system and connective tissue     History of gout    Personal history of other drug therapy     History of anticoagulant therapy    Personal history of other endocrine, nutritional and metabolic disease     History of hyperlipidemia    Personal history of other endocrine, nutritional and metabolic disease     History of vitamin D deficiency    Personal history of other endocrine, nutritional and metabolic disease     History of hyperthyroidism    Personal history of other endocrine, nutritional and metabolic disease     History of thyroid nodule    Personal history of other specified conditions     History of urinary hesitancy    Personal history of pneumonia (recurrent)     History of pneumonia    Personal history of pulmonary embolism     History of pulmonary embolism    Presence of automatic (implantable) cardiac defibrillator     Cardiac defibrillator in place       Past Surgical History:   Procedure Laterality Date    COLONOSCOPY  02/07/2017    Complete Colonoscopy    OTHER SURGICAL HISTORY  10/03/2016    Cardio-defib Pulse Generator Implantation Date     OTHER SURGICAL HISTORY  08/15/2022    Hernia repair    OTHER SURGICAL HISTORY  08/15/2022    Colonoscopy       Social History     Socioeconomic History    Marital status:      Spouse name: Not on file    Number of children: Not on file    Years of education: Not on file    Highest education level: Not on file   Occupational History    Not on file   Tobacco Use    Smoking status: Never    Smokeless tobacco: Never   Vaping Use    Vaping Use: Never used   Substance and Sexual Activity    Alcohol use: Not Currently    Drug use: Never    Sexual activity: Not on file   Other Topics Concern    Not on file   Social History Narrative    Not on file     Social Determinants of Health     Financial Resource Strain: Not on file   Food Insecurity: Not on file   Transportation Needs: Not on file   Physical Activity: Not on file   Stress: Not on file   Social Connections: Not on file   Intimate Partner Violence: Not At Risk (11/30/2023)    Humiliation, Afraid, Rape, and Kick questionnaire     Fear of Current or Ex-Partner: No     Emotionally Abused: No     Physically Abused: No     Sexually Abused: No   Housing Stability: Not on file       Objective    Physical Exam:  There were no vitals taken for this visit.  General : alert and oriented X3, no acute distress  Eyes : EOMI     Assessment/Plan   Fernando Nation is a 63 y.o. male with a hx of afib, Hypertrophic cardiomyopathy (goes to [facility]), defibillator, VENECIA, HTN, HLD, Vtach who presents for weight management and obesity.  No Glaucoma, (+) remote history of kidney stones.     1. Weight Management : Reviewed the principles of energy metabolism, caloric intake and expenditure, and rationale for a treatment program.  Also reinforced need for reduced calorie, low fat diet and increased physical activity.     We reviewed the possibility of starting an interdisciplinary lifestyle intervention program involving improvement of the diet, a personalized exercise program, efforts to  reduce the stress and the possibility of using appetite suppressant medications in an effort to help with the weight loss process.  The patient expressed interest in the plan.     2. Nutrition : I discussed trying one of the diet approaches we have here in the program : Mediterranean lifestyle, ketosis diet.  Working on the Medit diet -- but hasn't seen the Dietician.     9/26/23 : 332lb, 50.48kg/m2  11/01/23: 339.9lb, 51.68kg/m2  1/24/24: 338.1lb, 51.41kg/m2     3. Sleep : has VENECIA - being fitted for a mouth mask.     4. Stress : managed     5. Exercise : uses a walking machine for his legs.  Encouraged strength training.     6. Appetite : high.  discussed the AOM's  I told him I would avoid phentermine - because of his heart.  consider: topamax (did have a very remote h/o one kidney stone), contrave, plenity.  he has Prediabetes : but did discuss the GLP-1 and asked he look into his insurance.  --not on anything now.  Asked that he ask his PCP check a hga1c again -- hasn't been in checked since 2020.  Tried to order this, but [facility] wasn't allowing me to sign for it.  Unclear why.     7. Goal : to start strength training.    FU in a group visit.  Rei Mckeon MD

## 2024-01-24 ENCOUNTER — OFFICE VISIT (OUTPATIENT)
Dept: ENDOCRINOLOGY | Facility: CLINIC | Age: 64
End: 2024-01-24
Payer: MEDICARE

## 2024-01-24 VITALS
TEMPERATURE: 97.8 F | HEART RATE: 84 BPM | SYSTOLIC BLOOD PRESSURE: 112 MMHG | BODY MASS INDEX: 47.74 KG/M2 | WEIGHT: 315 LBS | DIASTOLIC BLOOD PRESSURE: 77 MMHG | HEIGHT: 68 IN

## 2024-01-24 DIAGNOSIS — I50.32 CHRONIC DIASTOLIC HEART FAILURE (MULTI): ICD-10-CM

## 2024-01-24 DIAGNOSIS — I42.2 CARDIOMYOPATHY, HYPERTROPHIC NONOBSTRUCTIVE (MULTI): ICD-10-CM

## 2024-01-24 DIAGNOSIS — E66.8 CONSTITUTIONAL OBESITY: ICD-10-CM

## 2024-01-24 PROCEDURE — 3008F BODY MASS INDEX DOCD: CPT | Performed by: INTERNAL MEDICINE

## 2024-01-24 PROCEDURE — 99215 OFFICE O/P EST HI 40 MIN: CPT | Performed by: INTERNAL MEDICINE

## 2024-01-24 PROCEDURE — 1036F TOBACCO NON-USER: CPT | Performed by: INTERNAL MEDICINE

## 2024-01-29 ENCOUNTER — ANTICOAGULATION - WARFARIN VISIT (OUTPATIENT)
Dept: CARDIOLOGY | Facility: HOSPITAL | Age: 64
End: 2024-01-29
Payer: MEDICARE

## 2024-01-29 DIAGNOSIS — I82.4Y9 DEEP VEIN THROMBOSIS (DVT) OF PROXIMAL LOWER EXTREMITY, UNSPECIFIED CHRONICITY, UNSPECIFIED LATERALITY (MULTI): Primary | ICD-10-CM

## 2024-01-29 LAB
POC INR: 2
POC PROTHROMBIN TIME: NORMAL

## 2024-01-29 PROCEDURE — 99211 OFF/OP EST MAY X REQ PHY/QHP: CPT | Performed by: FAMILY MEDICINE

## 2024-01-29 NOTE — PROGRESS NOTES
Patient identification verified with 2 identifiers.    Location: [facility] - 1st Floor Anticoagulation Clinic [address] [address]    Referring Physician: Dr Koo  Enrollment/ Re-enrollment date: 2024   INR Goal: 2.0-3.0  INR monitoring is per [facility] protocol.  Anticoagulation Medication: warfarin  Indication: Deep Vein Thrombosis (DVT)    Subjective   Bleeding signs/symptoms: No    Bruising: No   Major bleeding event: No  Thrombosis signs/symptoms: No  Thromboembolic event: No  Missed doses: No  Extra doses: No  Medication changes: No  Dietary changes: No  Change in health: No  Change in activity: No  Alcohol: No  Other concerns: No    Upcoming Surgeries:  Does the Patient Have any upcoming surgeries that require interruption in anticoagulation therapy? no  Does the patient require bridging? no      Anticoagulation Summary  As of 1/29/2024      INR goal:  2.0-3.0   TTR:  96.1 % (2.4 mo)   INR used for dosing:     Weekly warfarin total:  38 mg               Assessment/Plan   Therapeutic     1. New dose: no change    2. Next INR: 1 month      Education provided to patient during the visit:  Patient instructed to call in interim with questions, concerns and changes.

## 2024-01-30 ENCOUNTER — APPOINTMENT (OUTPATIENT)
Dept: ENDOCRINOLOGY | Facility: CLINIC | Age: 64
End: 2024-01-30
Payer: MEDICARE

## 2024-01-30 ENCOUNTER — TELEPHONE (OUTPATIENT)
Dept: PRIMARY CARE | Facility: CLINIC | Age: 64
End: 2024-01-30

## 2024-01-30 DIAGNOSIS — R73.03 PREDIABETES: Primary | ICD-10-CM

## 2024-01-30 PROBLEM — E55.9 VITAMIN D DEFICIENCY: Status: ACTIVE | Noted: 2023-06-26

## 2024-01-30 PROBLEM — M10.9 GOUT: Status: ACTIVE | Noted: 2023-04-07

## 2024-01-30 PROBLEM — K76.0 FATTY LIVER: Status: ACTIVE | Noted: 2024-01-30

## 2024-01-30 PROBLEM — E05.90 HYPERTHYROIDISM: Status: ACTIVE | Noted: 2024-01-30

## 2024-01-30 PROBLEM — K21.9 GASTROESOPHAGEAL REFLUX DISEASE: Status: ACTIVE | Noted: 2023-04-07

## 2024-01-30 PROBLEM — Z86.39 HISTORY OF HYPERTHYROIDISM: Status: ACTIVE | Noted: 2024-01-30

## 2024-01-30 PROBLEM — M17.12 OSTEOARTHRITIS OF LEFT KNEE: Status: ACTIVE | Noted: 2024-01-30

## 2024-01-30 PROBLEM — N18.30 STAGE 3 CHRONIC KIDNEY DISEASE (MULTI): Status: ACTIVE | Noted: 2024-01-30

## 2024-01-30 PROBLEM — I48.91 ATRIAL FIBRILLATION (MULTI): Status: ACTIVE | Noted: 2024-01-30

## 2024-01-30 PROBLEM — E78.5 DYSLIPIDEMIA: Status: ACTIVE | Noted: 2024-01-30

## 2024-01-30 PROBLEM — R00.2 PALPITATIONS: Status: ACTIVE | Noted: 2024-01-30

## 2024-01-30 PROBLEM — E78.00 HYPERCHOLESTEREMIA: Status: ACTIVE | Noted: 2018-08-29

## 2024-01-30 PROBLEM — Z86.718 PERSONAL HISTORY OF DVT (DEEP VEIN THROMBOSIS): Status: ACTIVE | Noted: 2018-08-29

## 2024-01-30 PROBLEM — I42.9 CARDIOMYOPATHY (MULTI): Status: ACTIVE | Noted: 2024-01-30

## 2024-01-30 PROBLEM — I51.9 SYSTOLIC DYSFUNCTION: Status: ACTIVE | Noted: 2024-01-30

## 2024-01-30 PROBLEM — I50.9 CONGESTIVE HEART FAILURE (MULTI): Status: ACTIVE | Noted: 2024-01-30

## 2024-01-30 NOTE — TELEPHONE ENCOUNTER
I am not sure why this is something they were not able to coordinate.  Per request, I have placed an order for A1c to be drawn at lab at earliest convenience.

## 2024-01-30 NOTE — TELEPHONE ENCOUNTER
Pt states he is seeing endocrinology for weight management and they are requesting the patient have an A1c drawn or done in office as NV as she was unable to place order in endo? Please advise

## 2024-01-31 ENCOUNTER — LAB (OUTPATIENT)
Dept: LAB | Facility: LAB | Age: 64
End: 2024-01-31
Payer: MEDICARE

## 2024-01-31 DIAGNOSIS — R73.03 PREDIABETES: ICD-10-CM

## 2024-01-31 LAB
EST. AVERAGE GLUCOSE BLD GHB EST-MCNC: 134 MG/DL
HBA1C MFR BLD: 6.3 %

## 2024-01-31 PROCEDURE — 36415 COLL VENOUS BLD VENIPUNCTURE: CPT

## 2024-01-31 PROCEDURE — 83036 HEMOGLOBIN GLYCOSYLATED A1C: CPT

## 2024-02-01 ENCOUNTER — TELEPHONE (OUTPATIENT)
Dept: ENDOCRINOLOGY | Facility: CLINIC | Age: 64
End: 2024-02-01
Payer: MEDICARE

## 2024-02-01 NOTE — TELEPHONE ENCOUNTER
I think wait on this and have the PCP repeat the hga1c in 3 months -- if then it rises to 6.5% - he may be able to get the coverage for the GLP-1 injection.    In the meantime - working on dietary changes - and really being strict and honest with himself about this is THE MOST IMPORTANT THING HE CAN DO.    Rei Mckeon MD

## 2024-02-01 NOTE — TELEPHONE ENCOUNTER
Patient called into the office wanting to let you know that he got his A1C lab drawn. The message above was sent by ordering provider's office to the patient. Please advise if anything is to be added.

## 2024-03-05 NOTE — PROGRESS NOTES
3/6/2024 New patient visit for sleep apnea -consult for surgical interventions    referred for an initial consultation with a long history of reported loud snoring, waking up feeling unrefreshed, excessive daytime fatigue. Gower Sleepiness Scale Score is 8 /24. Fatigue Severity Scale Score is 37/63. Snoring VAS 2 /10    Sleep study histories: (personally reviewed raw data such as interpretation report, data sheet, hypnogram, and titration table)  The patient has been previously diagnosed with obstructive sleep apnea (VENECIA),  in a sleep test performed on   06/01/2017, that showed an Apnea Hypopnea Index (AHI) of 46 and EMILY 16 with Cheyne-Pina pattern.     Patient reports he has started using a MAD that provides some improvement quality of sleep.     Patient does reports nasal obstruction and congestion.   NOSE-  6 / 20  Nasal Obstruction VAS- 5 /10    Physical Examination:    Neck circumference: is significantly enlarged    APPEARANCE:  Well-developed, well-nourished.   COMMUNICATION:  Able to communicate, no hoarseness.   HEAD AND FACE:  No mass or lesions, no salivary masses, normal facial strength.   EYES:  Ocular motility intact.   EARS:  Otoscopy of external auditory canals and tympanic membranes is normal, clinical speech reception thresholds grossly intact, no mass/lesion of auricle.     NOSE:     Nasal valve collapse: No   Septal deviation: Yesleft  Turbinate hypertrophy: No. Bilaterally 2+  Nasal mucosa is normal  There are no other masses polyps or purulent drainage.     MOUTH/ OCCLUSION:     Tonsil size is 1    Hard palate is not high arched.    Soft Palate is 4    Uvula: Enlarged and elongated.    Tongue: Position noted above the occlusal plane.        Modified Mallampatti tongue position: IIII        Scalloping is noted.        Tonsil size is significantly enlarged.  No mass/lesion of lips, teeth, gums, hard/soft palate, tongue, or oropharynx.      MAXILLOFACIAL:     On frontal repose, patient shows  symmetrical facial thirds,  On profile view, the patient has a Class I Facial Skeletal relationship.   The nasolabial angle is 100 degrees.   The labial mental angle is not acute.   The cervicomental angle is obtuse.      DENTAL:   No significant Overjet or Overbite   Right - Class 1 molar  Left - Class 1 molar  There is not skeletal transverse discrepancy with narrow maxilla.   NECK:  No cervical lymphadenopathy, no neck mass/crepitus/ asymmetry, trachea is midline, no thyroid enlargement/tenderness/mass.   NEURO/PSYCH:  Cranial nerves grossly intact, oriented x3 (time, place, person), appropriate mood and affect.    RESPIRATION:  Symmetric expansion during respiration, normal respiratory effort.   CARDIOVASCULAR: Pulse is regular rhythm and rate     Procedure: Diagnostic Nasal/ Pharyngeal Endoscopy    Indication for procedure: To evaluate sinonasal and pharyngeal anatomy not visible by anterior rhinoscopy and oral cavitiy examination    Anesthesia: 1% phenylephrine,4% lidocaine topical spray    Description:   A flexible endoscope was used to examine the left and right nasal cavities.  The nasal valve areas were examined for abnormalities or collapse.  The inferior and middle turbinates were evaluated and abnormalities noted.  The middle and superior meastuses, and the sphenoethmoid recesses were examined and inspected for mucopurulence and polyps.  The nasopharyngeal anatomy including posterior nasopharyngeal wall, auditory cheryl soft palate, and fossae of Rosenmuller were examined.  Abnormalities were noted in the above mentioned findings.  The patient tolerated the procedure without complications and was returned to ambulatory status.      Findings:   Internal nasal valve - no significant collapse  Inferior turbinates - bilat enlarged, pale mucosa  Septum - septal deviation to the left  Middle meatuses - Patent and clear.  Superior meatuses and sphenoethmoid recesses - patent and clear  Nasopharynx - clear  without lesions or masses  Coupling - Positive  Narrowed retropalatal space. Patient   No Adenoids signs of previous adenoidectomy    Chacon Maneuver:III  Lingual tonsils grade:II  Vocal Fold Visualization:III      Diagnose and Plan:  - Severe obesity and VENECIA and Cheyne-Pina    - Patient BMI (50) limits possible surgical interventions for VENECIA  It is explained to patient that if weight loss is achieved it can significantly improve VENECIA symptoms such as daytime sleepiness, irritability. There is also an overall improvement in cardiovascular and metabolic health.  Patient is referred to Surgical Weight Management program to help achieve and maintain weight loss success.

## 2024-03-06 ENCOUNTER — OFFICE VISIT (OUTPATIENT)
Dept: OTOLARYNGOLOGY | Facility: CLINIC | Age: 64
End: 2024-03-06
Payer: MEDICARE

## 2024-03-06 VITALS — HEIGHT: 69 IN | BODY MASS INDEX: 46.65 KG/M2 | WEIGHT: 315 LBS | TEMPERATURE: 96.5 F

## 2024-03-06 DIAGNOSIS — G47.33 OBSTRUCTIVE SLEEP APNEA: ICD-10-CM

## 2024-03-06 PROCEDURE — 1036F TOBACCO NON-USER: CPT

## 2024-03-06 PROCEDURE — 99214 OFFICE O/P EST MOD 30 MIN: CPT

## 2024-03-06 PROCEDURE — 31575 DIAGNOSTIC LARYNGOSCOPY: CPT

## 2024-03-06 PROCEDURE — 3008F BODY MASS INDEX DOCD: CPT

## 2024-03-06 ASSESSMENT — PATIENT HEALTH QUESTIONNAIRE - PHQ9
2. FEELING DOWN, DEPRESSED OR HOPELESS: NOT AT ALL
1. LITTLE INTEREST OR PLEASURE IN DOING THINGS: NOT AT ALL
SUM OF ALL RESPONSES TO PHQ9 QUESTIONS 1 AND 2: 0

## 2024-03-07 ENCOUNTER — TELEPHONE (OUTPATIENT)
Dept: PRIMARY CARE | Facility: CLINIC | Age: 64
End: 2024-03-07
Payer: MEDICARE

## 2024-03-07 NOTE — TELEPHONE ENCOUNTER
Spoke to patient personally.  Did have consultation with ENT specialist regarding possible VENECIA surgical intervention.  Patient was told that surgical intervention would not be possible and that more promising interventions would be with that of bariatric surgery.  Patient was given referral to Dr. Regan to which she currently has an appointment scheduled for 4/26/2024.  I reiterated that this is a very good resource to have a meaningful conversation/consultation and encouraged him to keep appointment as scheduled

## 2024-03-07 NOTE — TELEPHONE ENCOUNTER
PT HAS HEALTH ISSUE AND WOULD LIKE TO SPEAK DIRECTLY TO DR CHANEL. WOULD NOT DISCLOSE REASON FOR HIS CALL

## 2024-03-11 ENCOUNTER — ANTICOAGULATION - WARFARIN VISIT (OUTPATIENT)
Dept: CARDIOLOGY | Facility: HOSPITAL | Age: 64
End: 2024-03-11
Payer: MEDICARE

## 2024-03-11 DIAGNOSIS — I82.4Y9 DEEP VEIN THROMBOSIS (DVT) OF PROXIMAL LOWER EXTREMITY, UNSPECIFIED CHRONICITY, UNSPECIFIED LATERALITY (MULTI): Primary | ICD-10-CM

## 2024-03-11 LAB
POC INR: 2.2
POC PROTHROMBIN TIME: NORMAL

## 2024-03-11 PROCEDURE — 99211 OFF/OP EST MAY X REQ PHY/QHP: CPT | Performed by: FAMILY MEDICINE

## 2024-03-11 PROCEDURE — 85610 PROTHROMBIN TIME: CPT | Mod: QW

## 2024-03-11 NOTE — PROGRESS NOTES
Patient identification verified with 2 identifiers.    Location: [facility] - 1st Floor Anticoagulation Clinic [address] [id number]    Referring Physician: Dr Koo  Enrollment/ Re-enrollment date: 2024   INR Goal: 2.0-3.0  INR monitoring is per [facility] protocol.  Anticoagulation Medication: warfarin  Indication: DVT    Subjective   Bleeding signs/symptoms: No    Bruising: No   Major bleeding event: No  Thrombosis signs/symptoms: No  Thromboembolic event: No  Missed doses: No  Extra doses: No  Medication changes: No  Dietary changes: No  Change in health: No  Change in activity: No  Alcohol: No  Other concerns: No    Upcoming Procedures:  Does the Patient Have any upcoming procedures that require interruption in anticoagulation therapy? no  Does the patient require bridging? no      Anticoagulation Summary  As of 3/11/2024      INR goal:  2.0-3.0   TTR:  97.6 % (3.8 mo)   INR used for dosing:     Weekly warfarin total:  38 mg               Assessment/Plan   Therapeutic     1. New dose: no change    2. Next INR: 1 month      Education provided to patient during the visit:  Patient instructed to call in interim with questions, concerns and changes.

## 2024-03-29 RX ORDER — ALBUTEROL SULFATE 90 UG/1
2 AEROSOL, METERED RESPIRATORY (INHALATION) EVERY 6 HOURS PRN
COMMUNITY
End: 2024-04-17 | Stop reason: SDUPTHER

## 2024-03-29 RX ORDER — ALBUTEROL SULFATE 90 UG/1
2 AEROSOL, METERED RESPIRATORY (INHALATION) EVERY 6 HOURS PRN
Qty: 18 G | OUTPATIENT
Start: 2024-03-29

## 2024-04-11 ENCOUNTER — TELEPHONE (OUTPATIENT)
Dept: PRIMARY CARE | Facility: CLINIC | Age: 64
End: 2024-04-11
Payer: MEDICARE

## 2024-04-17 ENCOUNTER — OFFICE VISIT (OUTPATIENT)
Dept: PRIMARY CARE | Facility: CLINIC | Age: 64
End: 2024-04-17
Payer: MEDICARE

## 2024-04-17 VITALS
OXYGEN SATURATION: 95 % | HEART RATE: 88 BPM | TEMPERATURE: 97.8 F | WEIGHT: 315 LBS | DIASTOLIC BLOOD PRESSURE: 68 MMHG | BODY MASS INDEX: 46.65 KG/M2 | HEIGHT: 69 IN | SYSTOLIC BLOOD PRESSURE: 140 MMHG

## 2024-04-17 DIAGNOSIS — I42.2 CARDIOMYOPATHY, HYPERTROPHIC NONOBSTRUCTIVE (MULTI): ICD-10-CM

## 2024-04-17 DIAGNOSIS — E66.01 MORBID (SEVERE) OBESITY DUE TO EXCESS CALORIES (MULTI): ICD-10-CM

## 2024-04-17 DIAGNOSIS — Z86.718 PERSONAL HISTORY OF DVT (DEEP VEIN THROMBOSIS): ICD-10-CM

## 2024-04-17 DIAGNOSIS — E55.9 VITAMIN D DEFICIENCY: ICD-10-CM

## 2024-04-17 DIAGNOSIS — I10 ESSENTIAL HYPERTENSION: Primary | Chronic | ICD-10-CM

## 2024-04-17 DIAGNOSIS — I50.9 CHRONIC CONGESTIVE HEART FAILURE, UNSPECIFIED HEART FAILURE TYPE (MULTI): ICD-10-CM

## 2024-04-17 DIAGNOSIS — M1A.9XX0 CHRONIC GOUT WITHOUT TOPHUS, UNSPECIFIED CAUSE, UNSPECIFIED SITE: ICD-10-CM

## 2024-04-17 DIAGNOSIS — E78.00 HYPERCHOLESTEREMIA: ICD-10-CM

## 2024-04-17 DIAGNOSIS — R73.03 PREDIABETES: ICD-10-CM

## 2024-04-17 DIAGNOSIS — Z86.39 HISTORY OF HYPERTHYROIDISM: ICD-10-CM

## 2024-04-17 DIAGNOSIS — Z11.59 NEED FOR HEPATITIS C SCREENING TEST: ICD-10-CM

## 2024-04-17 DIAGNOSIS — N18.30 STAGE 3 CHRONIC KIDNEY DISEASE, UNSPECIFIED WHETHER STAGE 3A OR 3B CKD (MULTI): ICD-10-CM

## 2024-04-17 DIAGNOSIS — I48.91 ATRIAL FIBRILLATION, UNSPECIFIED TYPE (MULTI): ICD-10-CM

## 2024-04-17 DIAGNOSIS — J45.20 MILD INTERMITTENT REACTIVE AIRWAY DISEASE WITHOUT COMPLICATION (HHS-HCC): ICD-10-CM

## 2024-04-17 DIAGNOSIS — Z12.5 PROSTATE CANCER SCREENING: ICD-10-CM

## 2024-04-17 DIAGNOSIS — K21.9 GASTROESOPHAGEAL REFLUX DISEASE WITHOUT ESOPHAGITIS: ICD-10-CM

## 2024-04-17 DIAGNOSIS — K76.0 FATTY LIVER: ICD-10-CM

## 2024-04-17 PROBLEM — I82.409 DVT OF LEG (DEEP VENOUS THROMBOSIS) (MULTI): Status: RESOLVED | Noted: 2023-09-28 | Resolved: 2024-04-17

## 2024-04-17 PROBLEM — J06.9 URI, ACUTE: Status: RESOLVED | Noted: 2023-11-30 | Resolved: 2024-04-17

## 2024-04-17 PROBLEM — E78.5 DYSLIPIDEMIA: Status: RESOLVED | Noted: 2024-01-30 | Resolved: 2024-04-17

## 2024-04-17 PROCEDURE — 3078F DIAST BP <80 MM HG: CPT | Performed by: FAMILY MEDICINE

## 2024-04-17 PROCEDURE — 1036F TOBACCO NON-USER: CPT | Performed by: FAMILY MEDICINE

## 2024-04-17 PROCEDURE — 3077F SYST BP >= 140 MM HG: CPT | Performed by: FAMILY MEDICINE

## 2024-04-17 PROCEDURE — 3008F BODY MASS INDEX DOCD: CPT | Performed by: FAMILY MEDICINE

## 2024-04-17 PROCEDURE — 99214 OFFICE O/P EST MOD 30 MIN: CPT | Performed by: FAMILY MEDICINE

## 2024-04-17 RX ORDER — ALBUTEROL SULFATE 90 UG/1
2 AEROSOL, METERED RESPIRATORY (INHALATION) EVERY 6 HOURS PRN
Qty: 18 G | Refills: 3 | Status: SHIPPED | OUTPATIENT
Start: 2024-04-17

## 2024-04-17 ASSESSMENT — PAIN SCALES - GENERAL: PAINLEVEL: 0-NO PAIN

## 2024-04-17 ASSESSMENT — PATIENT HEALTH QUESTIONNAIRE - PHQ9
1. LITTLE INTEREST OR PLEASURE IN DOING THINGS: NOT AT ALL
SUM OF ALL RESPONSES TO PHQ9 QUESTIONS 1 AND 2: 0
2. FEELING DOWN, DEPRESSED OR HOPELESS: NOT AT ALL

## 2024-04-17 NOTE — PATIENT INSTRUCTIONS
Problem List Items Addressed This Visit             ICD-10-CM    Cardiomyopathy, hypertrophic nonobstructive (Multi) I42.2    Atrial fibrillation (Multi) I48.91    Congestive heart failure (Multi) I50.9     - Continue with current medication regimen and routine cardiology follow-up per protocol         Essential hypertension - Primary (Chronic) I10     - Blood pressure stable on current regimen         Fatty liver K76.0     - Will monitor liver enzyme levels with blood work ordered         Gastroesophageal reflux disease K21.9     - Stable         Gout M10.9     - Will check uric acid level blood work ordered  -Continue moderate red meat, seafood and alcohol intake         History of hyperthyroidism Z86.39     - Thyroid level stable on blood work completed in June to which we will monitor with blood work ordered today         Hypercholesteremia E78.00     - Will check cholesterol levels with blood work ordered today  -Continue on current statin regimen along with healthy, low-fat diet         Morbid (severe) obesity due to excess calories (Multi) E66.01     - Will continue with endocrinology follow-up and dietary modification with plans to see if medical weight loss medications are valid option going forward         Personal history of DVT (deep vein thrombosis) Z86.718     - Continue with Coumadin therapy and routine INR checks         Stage 3 chronic kidney disease (Multi) N18.30     - Will monitor kidney function blood work ordered  -Continue to focus on good daily hydration         Vitamin D deficiency E55.9     - Will check vitamin D level secondary to prior history of deficiency         Prediabetes R73.03     - Will plan for A1c sugar average after first 3-month  -Continue to focus on healthy, low-fat diet with moderation of carbohydrates and endocrinology follow-up per protocol          Other Visit Diagnoses         Codes    Prostate cancer screening     Z12.5    Need for hepatitis C screening test     Z11.59     Mild intermittent reactive airway disease without complication ([facility])     J45.20          Please plan to have routine blood work completed after first of the month      Counseling:       Medication education:         Education:  The patient is counseled regarding potential side-effects of all new medications        Understanding:  Patient expressed understanding        Adherence:  No barriers to adherence identified

## 2024-04-17 NOTE — ASSESSMENT & PLAN NOTE
>>ASSESSMENT AND PLAN FOR CONGESTIVE HEART FAILURE (MULTI) WRITTEN ON 4/17/2024  7:37 AM BY YANIQUE JAMES, DO    - Continue with current medication regimen and routine cardiology follow-up per protocol

## 2024-04-17 NOTE — ASSESSMENT & PLAN NOTE
- Will check cholesterol levels with blood work ordered today  -Continue on current statin regimen along with healthy, low-fat diet

## 2024-04-17 NOTE — ASSESSMENT & PLAN NOTE
- Thyroid level stable on blood work completed in June to which we will monitor with blood work ordered today

## 2024-04-17 NOTE — ASSESSMENT & PLAN NOTE
- Will check uric acid level blood work ordered  -Continue moderate red meat, seafood and alcohol intake

## 2024-04-17 NOTE — PROGRESS NOTES
Outpatient Visit Note    Chief Complaint   Patient presents with    Med Refill     Albuterol inhaler         HPI:  Fernando Nation is a 63 y.o. male with PMH significant for CAD/cardiomyopathy and atrial fibrillation with implanted defibrillator, gout, hypertension, hyperlipidemia, hypothyroidism, prediabetes and history of prior demyelinating/Guillain-Moscow type syndrome who presents to the office for follow-up.  He was last seen in the office on 11/30/2023 via telemedicine encounter secondary to complaints of cough and medication follow-up.  Prior to this he had been seen in the office on 8/11/2023 secondary to complaints of low back pain.    Last panel blood work completed in January including A1c which showed prediabetes at 6.2%.  Test was ordered at the request of endocrinology who patient was linked to secondary to ongoing weight struggles.  Patient additionally had metabolic panel completed in January with last comprehensive panel blood work completed in June 2023 including TSH, lipid panel, CBC, CMP and vitamin D level.    He continues to struggle with his weight gain 3 pounds since last being seen.  States that he does not eat very much during the day typically having breakfast and dinner.  Does have scheduled follow-up with endocrinology on 5/7/2024 as well as consultation with bariatric surgery later this month.    CAD/CHF/hypertension/hyperlipidemia/A-fib:  Patient continues have established relationship with cardiology at [facility].  He does regularly proceed to Coumadin clinic for INR checks.  Has been compliant with amiodarone, Lasix, lisinopril-hydrochlorothiazide, metoprolol and lovastatin regimen.  Denies any adverse side effects.  No reports of chest pain, shortness of breath, lightheadedness or dizziness.    Prediabetes:  No reports of polyuria, polydipsia, polyphagia or acute vision/sensation changes.    Of note, patient has reported history of hypothyroidism with TSH levels stable on most  recent blood work in June 2023 with no active supplementation.    Patient additionally has history of gout which is traditionally been managed with dietary modification.  No recent uric acid level checked.  Denies any recent flares    Does have known history of VENECIA to which he does follow with ENT/sleep medicine.  Does have occasional shortness of breath and wheezing which has historically been managed with albuterol inhaler.  Is requesting prescription refill at this time.        Current Medications  Current Outpatient Medications   Medication Instructions    albuterol 90 mcg/actuation inhaler 2 puffs, inhalation, Every 6 hours PRN    amiodarone (Pacerone) 200 mg tablet Every 24 hours    furosemide (LASIX) 40 mg, oral, Daily    lovastatin (MEVACOR) 20 mg, oral, Nightly    metoprolol tartrate (Lopressor) 25 mg tablet 1 tablet, oral, Every 12 hours    NON FORMULARY HANDICAPPED PARKING PLACARD (5 YR) 5 YEARS<BR>    warfarin (Coumadin) 2 mg tablet Sun and thrus 2 mg x2 tabs    warfarin (COUMADIN) 6 mg, oral, 5 times weekly        Allergies  No Known Allergies     Past Medical History:   Diagnosis Date    Asthma ([facility])     Blood vessel disorder     Heart disease, unspecified     Systolic dysfunction    Other headache syndrome     Headache syndrome    Personal history of other diseases of the circulatory system     History of cardiac disorder    Personal history of other diseases of the circulatory system     History of hypertension    Personal history of other diseases of the digestive system     History of gastroesophageal reflux (GERD)    Personal history of other diseases of the musculoskeletal system and connective tissue     History of gout    Personal history of other drug therapy     History of anticoagulant therapy    Personal history of other endocrine, nutritional and metabolic disease     History of hyperlipidemia    Personal history of other endocrine, nutritional and metabolic disease     History of  vitamin D deficiency    Personal history of other endocrine, nutritional and metabolic disease     History of hyperthyroidism    Personal history of other endocrine, nutritional and metabolic disease     History of thyroid nodule    Personal history of other specified conditions     History of urinary hesitancy    Personal history of pneumonia (recurrent)     History of pneumonia    Personal history of pulmonary embolism     History of pulmonary embolism    Presence of automatic (implantable) cardiac defibrillator     Cardiac defibrillator in place      Past Surgical History:   Procedure Laterality Date    COLONOSCOPY  02/07/2017    Complete Colonoscopy    OTHER SURGICAL HISTORY  10/03/2016    Cardio-defib Pulse Generator Implantation Date    OTHER SURGICAL HISTORY  08/15/2022    Hernia repair    OTHER SURGICAL HISTORY  08/15/2022    Colonoscopy     Family History   Problem Relation Name Age of Onset    Cancer Other      Heart disease Other       Social History     Tobacco Use    Smoking status: Never    Smokeless tobacco: Never   Vaping Use    Vaping status: Never Used   Substance Use Topics    Alcohol use: Not Currently    Drug use: Never       ROS  All pertinent positive symptoms are included in the history of present illness.  All other systems have been reviewed and are negative and noncontributory to this patient's current ailments.    VITAL SIGNS  Vitals:    04/17/24 1007   BP: 140/68   Pulse: 88   Temp: 36.6 °C (97.8 °F)   SpO2: 95%       PHYSICAL EXAM  GENERAL APPEARANCE: alert and oriented, Pleasant and cooperative, No Acute Distress  HEENT: EOMI, PERRLA, MMM  HEART: RRR, normal S1S2, no murmurs, click or rubs  LUNGS: clear to auscultation bilaterally, no wheezes/rhonchi/rales  EXTREMITIES: no edema, normal ROM  SKIN: normal, no rash, unremarkable  NEUROLOGIC EXAM: non-focal exam  MUSCULOSKELETAL: no gross abnormalities  PSYCH: affect is normal, eye contact is good      Assessment/Plan   Problem List Items  Addressed This Visit             ICD-10-CM    Cardiomyopathy, hypertrophic nonobstructive (Multi) I42.2    Relevant Orders    TSH with reflex to Free T4 if abnormal    Lipid Panel    Comprehensive Metabolic Panel    CBC    Atrial fibrillation (Multi) I48.91    Relevant Orders    TSH with reflex to Free T4 if abnormal    Lipid Panel    Comprehensive Metabolic Panel    CBC    Congestive heart failure (Multi) I50.9     - Continue with current medication regimen and routine cardiology follow-up per protocol         Relevant Orders    TSH with reflex to Free T4 if abnormal    Lipid Panel    Comprehensive Metabolic Panel    CBC    Essential hypertension - Primary (Chronic) I10     - Blood pressure stable on current regimen         Relevant Orders    TSH with reflex to Free T4 if abnormal    Lipid Panel    Comprehensive Metabolic Panel    CBC    Fatty liver K76.0     - Will monitor liver enzyme levels with blood work ordered         Relevant Orders    Lipid Panel    Comprehensive Metabolic Panel    Gastroesophageal reflux disease K21.9     - Stable         Gout M10.9     - Will check uric acid level blood work ordered  -Continue moderate red meat, seafood and alcohol intake         Relevant Orders    Uric acid    History of hyperthyroidism Z86.39     - Thyroid level stable on blood work completed in June to which we will monitor with blood work ordered today         Hypercholesteremia E78.00     - Will check cholesterol levels with blood work ordered today  -Continue on current statin regimen along with healthy, low-fat diet         Relevant Orders    TSH with reflex to Free T4 if abnormal    Lipid Panel    Comprehensive Metabolic Panel    Morbid (severe) obesity due to excess calories (Multi) E66.01     - Will continue with endocrinology follow-up and dietary modification with plans to see if medical weight loss medications are valid option going forward         Personal history of DVT (deep vein thrombosis) Z86.718     -  Continue with Coumadin therapy and routine INR checks         Stage 3 chronic kidney disease (Multi) N18.30     - Will monitor kidney function blood work ordered  -Continue to focus on good daily hydration         Relevant Orders    Comprehensive Metabolic Panel    Vitamin D deficiency E55.9     - Will check vitamin D level secondary to prior history of deficiency         Relevant Orders    Vitamin D 25-Hydroxy,Total (for eval of Vitamin D levels)    Prediabetes R73.03     - Will plan for A1c sugar average after first 3-month  -Continue to focus on healthy, low-fat diet with moderation of carbohydrates and endocrinology follow-up per protocol         Relevant Orders    TSH with reflex to Free T4 if abnormal    Lipid Panel    Comprehensive Metabolic Panel    Hemoglobin A1c     Other Visit Diagnoses         Codes    Prostate cancer screening     Z12.5    Relevant Orders    Prostate Spec.Ag,Screen    Need for hepatitis C screening test     Z11.59    Relevant Orders    Hepatitis C antibody    Mild intermittent reactive airway disease without complication ([facility])     J45.20    Relevant Medications    albuterol 90 mcg/actuation inhaler          Please plan to have routine blood work completed after first of the month      Counseling:       Medication education:         Education:  The patient is counseled regarding potential side-effects of all new medications        Understanding:  Patient expressed understanding        Adherence:  No barriers to adherence identified

## 2024-04-17 NOTE — ASSESSMENT & PLAN NOTE
- Will plan for A1c sugar average after first 3-month  -Continue to focus on healthy, low-fat diet with moderation of carbohydrates and endocrinology follow-up per protocol

## 2024-04-17 NOTE — ASSESSMENT & PLAN NOTE
- Will continue with endocrinology follow-up and dietary modification with plans to see if medical weight loss medications are valid option going forward

## 2024-04-18 ENCOUNTER — TELEPHONE (OUTPATIENT)
Dept: PRIMARY CARE | Facility: CLINIC | Age: 64
End: 2024-04-18
Payer: MEDICARE

## 2024-04-18 NOTE — TELEPHONE ENCOUNTER
PT WENT TO  RX  [facility]  IN [place] SAID THAT HIS INSURANCE IS NOT GOING TO COVER HIS INHALER  NOW IT WILL COST $44 AND HE CANNOT AFFORD THAT  HE IS ASKING IF MAYBE THERE WAS A PROBLEM WITH CODING

## 2024-04-22 NOTE — TELEPHONE ENCOUNTER
DARREL DUFFY CAN YOU PLEASE LOOK INTO THIS FOR US AND IF THERE IS AN ALTERNATE THAT PT INS WILL COVER LET US KNOW SO THAT THE PCP CAN ORDER ACCORDINGLY, THANK YOU.

## 2024-04-24 ENCOUNTER — ANTICOAGULATION - WARFARIN VISIT (OUTPATIENT)
Dept: CARDIOLOGY | Facility: HOSPITAL | Age: 64
End: 2024-04-24
Payer: MEDICARE

## 2024-04-24 DIAGNOSIS — I82.4Y9 DEEP VEIN THROMBOSIS (DVT) OF PROXIMAL LOWER EXTREMITY, UNSPECIFIED CHRONICITY, UNSPECIFIED LATERALITY (MULTI): Primary | ICD-10-CM

## 2024-04-24 LAB
POC INR: 2.3
POC PROTHROMBIN TIME: NORMAL

## 2024-04-24 PROCEDURE — 85610 PROTHROMBIN TIME: CPT

## 2024-04-24 PROCEDURE — 99211 OFF/OP EST MAY X REQ PHY/QHP: CPT | Performed by: FAMILY MEDICINE

## 2024-04-24 NOTE — PROGRESS NOTES
Patient identification verified with 2 identifiers.    Location: [facility] - 1st Floor Anticoagulation Clinic [address] [id number]    Referring Physician: DR Koo  Enrollment/ Re-enrollment date:    INR Goal: 2.0-3.0  INR monitoring is per [facility] protocol.  Anticoagulation Medication: warfarin  Indication: Deep Vein Thrombosis (DVT)    Subjective   Bleeding signs/symptoms: No    Bruising: No   Major bleeding event: No  Thrombosis signs/symptoms: No  Thromboembolic event: No  Missed doses: No  Extra doses: No  Medication changes: No  Dietary changes: No  Change in health: No  Change in activity: No  Alcohol: No  Other concerns: No    Upcoming Procedures:  Does the Patient Have any upcoming procedures that require interruption in anticoagulation therapy? no  Does the patient require bridging? no      Anticoagulation Summary  As of 2024      INR goal:  2.0-3.0   TTR:  98.6% (6.6 mo)   INR used for dosin.30 (2024)   Weekly warfarin total:  38 mg               Assessment/Plan   Therapeutic     1. New dose: no change    2. Next INR: 1 month      Education provided to patient during the visit:  Patient instructed to call in interim with questions, concerns and changes.

## 2024-04-24 NOTE — PROGRESS NOTES
Subjective   Patient ID: Fernando Nation is a 63 y.o. male who presents for No chief complaint on file..  HPI  BARIATRIC CONSULT  START WT:336  IDEAL WT:165 START EXCESS:  171 HT: 68  YRS OF OBESITY:entire life  GREATEST WT LOSS IN LBS: 13  PROGRAMS FOR WT LOSS IN THE PAST: nutrisystem, low danelle, diet restrictions    Review of Systems  Allergy/Immunologic:          HIV / AIDS No.  Hepatitis A No.  Hepatitis B No.  Hepatitis C No.  Immunosuppressent drugs No.         HEENT:          Headache negative.   ADMITS TO FATIGUE      CARDIOLOGY:          History of Hyperlipidemia No.  Last stress test N/A.  Last echocardiogram N/A.  Chest pain No.  High blood pressure yes Irregular heart beat No.  Known coronary artery disease No.  Pacemaker No.  Palpitations No.         RESPIRATORY:          Hx steroid use No.  ER visits or Hospitalizations for breathing problems No.  Sleep Apnea SNORING, DAYTIME SLEEPINESS, .  BOOKER (dyspnea on exertion) YES Hx of Asthma/COPD No.         GASTROENTEROLOGY:          Peptic ulcer No, Last EGD N/A, Last UGI N/A.  Colonoscopy  Last Colonoscopy N/A.  Heartburn No.    ADMITS CHANGE IN BOWEL MOVEMENTS     ENDOCRINOLOGY:          Diabetes No.  Thyroid disorder No.         EXTREMITIES:          Varicose Veins No.  Stasis Ulcers No.  Ankle swelling No.  Personal history DVT No.  Personal history PE No.  Personal history of other thrombolic events No.  Family history of VTE No.  Known genetic bleeding or clotting disorder No.         UROLOGY:          Kidney disease No.  Kidney stones No.  Previous UTIs No.  Urinary incontinence No.         MUSCULOSKELETAL:          Osteoporosis/Osteopenia No.  Arthritis ADMITS TO GOUT  Joint pain YES         SKIN:          Hidradenitis No.  Open skin wounds No.  Rosacea No.  Healing problems No.         PSYCHOLOGY:          Anxiety none.  Depression none.  Eating disorder denies.        Objective   Physical Exam    Assessment/Plan            Katie Lott LPN  04/24/24 4:33 PM

## 2024-04-26 ENCOUNTER — OFFICE VISIT (OUTPATIENT)
Dept: SURGERY | Facility: CLINIC | Age: 64
End: 2024-04-26
Payer: MEDICARE

## 2024-04-26 VITALS
HEART RATE: 93 BPM | HEIGHT: 68 IN | SYSTOLIC BLOOD PRESSURE: 132 MMHG | WEIGHT: 315 LBS | DIASTOLIC BLOOD PRESSURE: 90 MMHG | BODY MASS INDEX: 47.74 KG/M2

## 2024-04-26 DIAGNOSIS — K76.0 FATTY LIVER: ICD-10-CM

## 2024-04-26 DIAGNOSIS — Z95.810 IMPLANTABLE CARDIOVERTER-DEFIBRILLATOR (ICD) IN SITU: ICD-10-CM

## 2024-04-26 DIAGNOSIS — I10 ESSENTIAL HYPERTENSION: Chronic | ICD-10-CM

## 2024-04-26 DIAGNOSIS — I48.91 ATRIAL FIBRILLATION, UNSPECIFIED TYPE (MULTI): ICD-10-CM

## 2024-04-26 DIAGNOSIS — E66.01 MORBID OBESITY WITH BMI OF 50.0-59.9, ADULT (MULTI): Primary | ICD-10-CM

## 2024-04-26 DIAGNOSIS — E78.00 HYPERCHOLESTEREMIA: ICD-10-CM

## 2024-04-26 DIAGNOSIS — G47.33 OBSTRUCTIVE SLEEP APNEA OF ADULT: ICD-10-CM

## 2024-04-26 DIAGNOSIS — G47.33 OBSTRUCTIVE SLEEP APNEA: ICD-10-CM

## 2024-04-26 DIAGNOSIS — K43.2 RECURRENT INCISIONAL HERNIA: ICD-10-CM

## 2024-04-26 DIAGNOSIS — I42.8 OTHER CARDIOMYOPATHY (MULTI): ICD-10-CM

## 2024-04-26 PROCEDURE — 3008F BODY MASS INDEX DOCD: CPT | Performed by: SURGERY

## 2024-04-26 PROCEDURE — 3080F DIAST BP >= 90 MM HG: CPT | Performed by: SURGERY

## 2024-04-26 PROCEDURE — 3075F SYST BP GE 130 - 139MM HG: CPT | Performed by: SURGERY

## 2024-04-26 PROCEDURE — 99204 OFFICE O/P NEW MOD 45 MIN: CPT | Performed by: SURGERY

## 2024-04-26 ASSESSMENT — COLUMBIA-SUICIDE SEVERITY RATING SCALE - C-SSRS
2. HAVE YOU ACTUALLY HAD ANY THOUGHTS OF KILLING YOURSELF?: NO
6. HAVE YOU EVER DONE ANYTHING, STARTED TO DO ANYTHING, OR PREPARED TO DO ANYTHING TO END YOUR LIFE?: NO
1. IN THE PAST MONTH, HAVE YOU WISHED YOU WERE DEAD OR WISHED YOU COULD GO TO SLEEP AND NOT WAKE UP?: NO

## 2024-04-26 ASSESSMENT — PATIENT HEALTH QUESTIONNAIRE - PHQ9
2. FEELING DOWN, DEPRESSED OR HOPELESS: NOT AT ALL
SUM OF ALL RESPONSES TO PHQ9 QUESTIONS 1 AND 2: 0
1. LITTLE INTEREST OR PLEASURE IN DOING THINGS: NOT AT ALL

## 2024-04-26 ASSESSMENT — LIFESTYLE VARIABLES
HOW OFTEN DO YOU HAVE A DRINK CONTAINING ALCOHOL: MONTHLY OR LESS
SKIP TO QUESTIONS 9-10: 1
HOW MANY STANDARD DRINKS CONTAINING ALCOHOL DO YOU HAVE ON A TYPICAL DAY: 1 OR 2
HOW OFTEN DO YOU HAVE SIX OR MORE DRINKS ON ONE OCCASION: NEVER
AUDIT-C TOTAL SCORE: 1

## 2024-04-26 ASSESSMENT — PAIN SCALES - GENERAL: PAINLEVEL: 0-NO PAIN

## 2024-05-01 PROBLEM — K43.2 RECURRENT INCISIONAL HERNIA: Status: ACTIVE | Noted: 2024-05-01

## 2024-05-01 PROBLEM — E66.01 MORBID OBESITY WITH BMI OF 50.0-59.9, ADULT (MULTI): Status: ACTIVE | Noted: 2024-05-01

## 2024-05-02 NOTE — H&P
History Of Present Illness  Fernando Nation is a 63 y.o. male here for consultation for bariatric surgery. He suffers from morbid obesity and has been overweight for many years and has a number of weight related comorbidities. He has attempted to lose weight several times over the years. He has had successes but has regained the weight at the completion of each of his dieting attempts. He is being referred for surgical intervention for his clinically significant morbid obesity.     -Bariatric Consultation:         .START WT:336  IDEAL WT:165 START EXCESS:  171 HT: 68  YRS OF OBESITY:entire life  GREATEST WT LOSS IN LBS: 13  PROGRAMS FOR WT LOSS IN THE PAST: nutrisystem, low danelle, diet restrictions     Past Medical History  Past Medical History:   Diagnosis Date    Asthma ([facility])     Blood vessel disorder     Heart disease, unspecified     Systolic dysfunction    Other headache syndrome     Headache syndrome    Personal history of other diseases of the circulatory system     History of cardiac disorder    Personal history of other diseases of the circulatory system     History of hypertension    Personal history of other diseases of the digestive system     History of gastroesophageal reflux (GERD)    Personal history of other diseases of the musculoskeletal system and connective tissue     History of gout    Personal history of other drug therapy     History of anticoagulant therapy    Personal history of other endocrine, nutritional and metabolic disease     History of hyperlipidemia    Personal history of other endocrine, nutritional and metabolic disease     History of vitamin D deficiency    Personal history of other endocrine, nutritional and metabolic disease     History of hyperthyroidism    Personal history of other endocrine, nutritional and metabolic disease     History of thyroid nodule    Personal history of other specified conditions     History of urinary hesitancy    Personal history of pneumonia  (recurrent)     History of pneumonia    Personal history of pulmonary embolism     History of pulmonary embolism    Presence of automatic (implantable) cardiac defibrillator     Cardiac defibrillator in place       Surgical History  Past Surgical History:   Procedure Laterality Date    COLONOSCOPY  02/07/2017    Complete Colonoscopy    OTHER SURGICAL HISTORY  10/03/2016    Cardio-defib Pulse Generator Implantation Date    OTHER SURGICAL HISTORY  08/15/2022    Hernia repair    OTHER SURGICAL HISTORY  08/15/2022    Colonoscopy        Social History  He reports that he has never smoked. He has never used smokeless tobacco. He reports current alcohol use. He reports that he does not use drugs.    Family History  Family History   Problem Relation Name Age of Onset    Cancer Mother      Other (HTN) Father      Blood clot Father      Diabetes Father      Other (MORBID OBESITY) Father      Cancer Other      Heart disease Other          Allergies  Patient has no known allergies.    Review of Systems   Allergy/Immunologic:          HIV / AIDS No.  Hepatitis A No.  Hepatitis B No.  Hepatitis C No.  Immunosuppressent drugs No.         HEENT:          Headache negative.   ADMITS TO FATIGUE      CARDIOLOGY:          History of Hyperlipidemia No.  Last stress test N/A.  Last echocardiogram N/A.  Chest pain No.  High blood pressure yes Irregular heart beat No.  Known coronary artery disease No.  Pacemaker No.  Palpitations No.         RESPIRATORY:          Hx steroid use No.  ER visits or Hospitalizations for breathing problems No.  Sleep Apnea SNORING, DAYTIME SLEEPINESS, .  BOOKER (dyspnea on exertion) YES Hx of Asthma/COPD No.         GASTROENTEROLOGY:          Peptic ulcer No, Last EGD N/A, Last UGI N/A.  Colonoscopy  Last Colonoscopy N/A.  Heartburn No.    ADMITS CHANGE IN BOWEL MOVEMENTS     ENDOCRINOLOGY:          Diabetes No.  Thyroid disorder No.         EXTREMITIES:          Varicose Veins No.  Stasis Ulcers No.  Ankle  "swelling No.  Personal history DVT No.  Personal history PE No.  Personal history of other thrombolic events No.  Family history of VTE No.  Known genetic bleeding or clotting disorder No.         UROLOGY:          Kidney disease No.  Kidney stones No.  Previous UTIs No.  Urinary incontinence No.         MUSCULOSKELETAL:          Osteoporosis/Osteopenia No.  Arthritis ADMITS TO GOUT  Joint pain YES         SKIN:          Hidradenitis No.  Open skin wounds No.  Rosacea No.  Healing problems No.         PSYCHOLOGY:          Anxiety none.  Depression none.  Eating disorder denies.            Physical Exam       General Examination:         GENERAL APPEARANCE: alert and oriented x 3, Pleasant and cooperative, No Acute Distress.          HEENT: PERRLA.          NECK: no lymphadenopathy, no thyromegaly, no JVD, normal flexion, normal extension.          LUNGS: clear to auscultation bilaterally.          CHEST: normal shape and expansion.          ABDOMEN: obese, palpable nonreducible umbilical hernia, soft and not tender, no guarding, no CVA tenderness.          EXTREMITIES: trace bilateral edema, no ulcerations.          SKIN: normal, no rash.          NEUROLOGIC EXAM: CN's II-XII grossly intact, gait normal.          BACK: no CVA tenderness.       Last Recorded Vitals  Blood pressure 132/90, pulse 93, height 1.727 m (5' 8\"), weight (!) 152 kg (336 lb).           Assessment/Plan   Problem List Items Addressed This Visit             ICD-10-CM    Obstructive sleep apnea of adult G47.33    Atrial fibrillation (Multi) I48.91    Implantable cardioverter-defibrillator (ICD) in situ Z95.810    Cardiomyopathy (Multi) I42.9    Essential hypertension (Chronic) I10    Fatty liver K76.0    Hypercholesteremia E78.00    Morbid obesity with BMI of 50.0-59.9, adult (Multi) - Primary E66.01, Z68.43    Recurrent incisional hernia K43.2     Other Visit Diagnoses         Codes    Obstructive sleep apnea     G47.33    BMI 50.0-59.9, adult " (Wcxhz)     Z68.43            We spent 45 minutes reviewing the three surgical options available in the treatment of morbid obesity in our practice. We reviewed the laparoscopic approach to the Daksha-en-Y gastric bypass, the vertical sleeve gastrectomy, and the adjustable gastric band. We reviewed the surgical technique, the risks, and my complication rates. We also reviewed the expected weight loss, the rules necessary for nydia-term success, the nutritional supplementation recommended for each operation, and the importance of incorporating excercise into the lifestyle to maintain the weight. We reviewed both the national history with each operation, my experience with each operation, the lack of long-term weight loss data with the vertical sleeve gastrectomy and the potential for exacerbating reflux with the vertical sleeve gastrectomy. In addition, we discussed the risk of adhesions, internal hernias, iron and calcium deficiency, dumping syndrome and potential for gastrojejunal ulcer with the RYGB. Fernando would like to proceed with VSG.  He has a history of a dilated cardiomyopathy with atrial fibrillation.  He will need cardiac clearance for surgery.  Additionally, he will need a coumadin to surgery to lovenox to coumadin bridge perioperatively.  He has an incarcerated recurrent abdominal wall hernia.  We discussed not addressing this at the time of his operation but rather managing it after significant weight loss to reduce recurrence risk.  We discussed that there is a risk his hernia will become a surgical emergency in the interim.         Jah Regan MD

## 2024-05-03 ENCOUNTER — TELEPHONE (OUTPATIENT)
Dept: PRIMARY CARE | Facility: CLINIC | Age: 64
End: 2024-05-03

## 2024-05-03 ENCOUNTER — LAB (OUTPATIENT)
Dept: LAB | Facility: LAB | Age: 64
End: 2024-05-03
Payer: MEDICARE

## 2024-05-03 DIAGNOSIS — E66.01 MORBID OBESITY WITH BMI OF 50.0-59.9, ADULT (MULTI): ICD-10-CM

## 2024-05-03 DIAGNOSIS — M10.9 GOUTY ARTHRITIS: ICD-10-CM

## 2024-05-03 DIAGNOSIS — R73.03 PREDIABETES: ICD-10-CM

## 2024-05-03 DIAGNOSIS — I42.2 CARDIOMYOPATHY, HYPERTROPHIC NONOBSTRUCTIVE (MULTI): ICD-10-CM

## 2024-05-03 DIAGNOSIS — N18.30 STAGE 3 CHRONIC KIDNEY DISEASE, UNSPECIFIED WHETHER STAGE 3A OR 3B CKD (MULTI): ICD-10-CM

## 2024-05-03 DIAGNOSIS — Z12.5 PROSTATE CANCER SCREENING: ICD-10-CM

## 2024-05-03 DIAGNOSIS — K76.0 FATTY LIVER: ICD-10-CM

## 2024-05-03 DIAGNOSIS — E63.9 NUTRITIONAL DISORDER: ICD-10-CM

## 2024-05-03 DIAGNOSIS — G47.33 OBSTRUCTIVE SLEEP APNEA: ICD-10-CM

## 2024-05-03 DIAGNOSIS — D50.8 IRON DEFICIENCY ANEMIA SECONDARY TO INADEQUATE DIETARY IRON INTAKE: ICD-10-CM

## 2024-05-03 DIAGNOSIS — I48.91 ATRIAL FIBRILLATION, UNSPECIFIED TYPE (MULTI): ICD-10-CM

## 2024-05-03 DIAGNOSIS — E51.9 THIAMINE DEFICIENCY: ICD-10-CM

## 2024-05-03 DIAGNOSIS — Z86.718 PERSONAL HISTORY OF DVT (DEEP VEIN THROMBOSIS): ICD-10-CM

## 2024-05-03 DIAGNOSIS — E78.00 HYPERCHOLESTEREMIA: ICD-10-CM

## 2024-05-03 DIAGNOSIS — Z95.810 ICD (IMPLANTABLE CARDIOVERTER-DEFIBRILLATOR) IN PLACE: ICD-10-CM

## 2024-05-03 DIAGNOSIS — I50.9 CHRONIC CONGESTIVE HEART FAILURE, UNSPECIFIED HEART FAILURE TYPE (MULTI): ICD-10-CM

## 2024-05-03 DIAGNOSIS — R73.9 HYPERGLYCEMIA: ICD-10-CM

## 2024-05-03 DIAGNOSIS — E53.8 B12 DEFICIENCY: ICD-10-CM

## 2024-05-03 DIAGNOSIS — I10 ESSENTIAL HYPERTENSION: Chronic | ICD-10-CM

## 2024-05-03 DIAGNOSIS — I42.2 HYPERTROPHIC CARDIOMYOPATHY (MULTI): ICD-10-CM

## 2024-05-03 DIAGNOSIS — E55.9 VITAMIN D DEFICIENCY: ICD-10-CM

## 2024-05-03 DIAGNOSIS — Z86.79 HISTORY OF VENTRICULAR TACHYCARDIA: ICD-10-CM

## 2024-05-03 DIAGNOSIS — E61.0 COPPER DEFICIENCY: ICD-10-CM

## 2024-05-03 DIAGNOSIS — E07.9 DISEASE OF THYROID GLAND: ICD-10-CM

## 2024-05-03 DIAGNOSIS — I48.11 LONGSTANDING PERSISTENT ATRIAL FIBRILLATION (MULTI): ICD-10-CM

## 2024-05-03 DIAGNOSIS — Z11.59 NEED FOR HEPATITIS C SCREENING TEST: ICD-10-CM

## 2024-05-03 DIAGNOSIS — R06.2 WHEEZING: Primary | ICD-10-CM

## 2024-05-03 DIAGNOSIS — Z71.3 ENCOUNTER FOR NUTRITION EVALUATION PRIOR TO BARIATRIC SURGERY: ICD-10-CM

## 2024-05-03 DIAGNOSIS — M1A.9XX0 CHRONIC GOUT WITHOUT TOPHUS, UNSPECIFIED CAUSE, UNSPECIFIED SITE: ICD-10-CM

## 2024-05-03 LAB
25(OH)D3 SERPL-MCNC: 24 NG/ML (ref 31–100)
ALBUMIN SERPL-MCNC: 4.2 G/DL (ref 3.5–5)
ALP BLD-CCNC: 67 U/L (ref 35–125)
ALT SERPL-CCNC: 21 U/L (ref 5–40)
ANION GAP SERPL CALC-SCNC: 15 MMOL/L
AST SERPL-CCNC: 21 U/L (ref 5–40)
BILIRUB SERPL-MCNC: 1.1 MG/DL (ref 0.1–1.2)
BUN SERPL-MCNC: 16 MG/DL (ref 8–25)
CALCIUM SERPL-MCNC: 9.4 MG/DL (ref 8.5–10.4)
CHLORIDE SERPL-SCNC: 101 MMOL/L (ref 97–107)
CHOLEST SERPL-MCNC: 154 MG/DL (ref 133–200)
CHOLEST/HDLC SERPL: 2.2 {RATIO}
CO2 SERPL-SCNC: 25 MMOL/L (ref 24–31)
CREAT SERPL-MCNC: 1.4 MG/DL (ref 0.4–1.6)
EGFRCR SERPLBLD CKD-EPI 2021: 57 ML/MIN/1.73M*2
ERYTHROCYTE [DISTWIDTH] IN BLOOD BY AUTOMATED COUNT: 14.3 % (ref 11.5–14.5)
EST. AVERAGE GLUCOSE BLD GHB EST-MCNC: 128 MG/DL
GLUCOSE SERPL-MCNC: 94 MG/DL (ref 65–99)
HBA1C MFR BLD: 6.1 %
HCT VFR BLD AUTO: 47.6 % (ref 41–52)
HCV AB SER QL: NONREACTIVE
HDLC SERPL-MCNC: 69 MG/DL
HGB BLD-MCNC: 15 G/DL (ref 13.5–17.5)
LDLC SERPL CALC-MCNC: 70 MG/DL (ref 65–130)
MCH RBC QN AUTO: 29.4 PG (ref 26–34)
MCHC RBC AUTO-ENTMCNC: 31.5 G/DL (ref 32–36)
MCV RBC AUTO: 93 FL (ref 80–100)
NRBC BLD-RTO: 0 /100 WBCS (ref 0–0)
PLATELET # BLD AUTO: 182 X10*3/UL (ref 150–450)
POTASSIUM SERPL-SCNC: 3.7 MMOL/L (ref 3.4–5.1)
PROT SERPL-MCNC: 6.7 G/DL (ref 5.9–7.9)
PSA SERPL-MCNC: 0.5 NG/ML
RBC # BLD AUTO: 5.1 X10*6/UL (ref 4.5–5.9)
SODIUM SERPL-SCNC: 141 MMOL/L (ref 133–145)
TRIGL SERPL-MCNC: 77 MG/DL (ref 40–150)
TSH SERPL DL<=0.05 MIU/L-ACNC: 1.74 MIU/L (ref 0.27–4.2)
URATE SERPL-MCNC: 8.4 MG/DL (ref 3.6–7.7)
WBC # BLD AUTO: 3.8 X10*3/UL (ref 4.4–11.3)

## 2024-05-03 PROCEDURE — 83036 HEMOGLOBIN GLYCOSYLATED A1C: CPT

## 2024-05-03 PROCEDURE — 85027 COMPLETE CBC AUTOMATED: CPT

## 2024-05-03 PROCEDURE — G0103 PSA SCREENING: HCPCS

## 2024-05-03 PROCEDURE — 83540 ASSAY OF IRON: CPT

## 2024-05-03 PROCEDURE — 80053 COMPREHEN METABOLIC PANEL: CPT

## 2024-05-03 PROCEDURE — 36415 COLL VENOUS BLD VENIPUNCTURE: CPT

## 2024-05-03 PROCEDURE — 86803 HEPATITIS C AB TEST: CPT

## 2024-05-03 PROCEDURE — 82306 VITAMIN D 25 HYDROXY: CPT

## 2024-05-03 PROCEDURE — 84550 ASSAY OF BLOOD/URIC ACID: CPT

## 2024-05-03 PROCEDURE — 83550 IRON BINDING TEST: CPT

## 2024-05-03 PROCEDURE — 82728 ASSAY OF FERRITIN: CPT

## 2024-05-03 PROCEDURE — 80061 LIPID PANEL: CPT

## 2024-05-03 PROCEDURE — 84443 ASSAY THYROID STIM HORMONE: CPT

## 2024-05-03 RX ORDER — METHYLPREDNISOLONE 4 MG/1
TABLET ORAL
Qty: 21 TABLET | Refills: 0 | Status: SHIPPED | OUTPATIENT
Start: 2024-05-03 | End: 2024-05-10

## 2024-05-03 NOTE — TELEPHONE ENCOUNTER
PT STATES THE ALBUTEROL ASTHMA INHALER MEDICATION THAT WAS PRESCRIBED FOR HIM IS NOT WORKING IS THERE SOMETHING ELSE THAT CAN BE [facility]

## 2024-05-03 NOTE — TELEPHONE ENCOUNTER
Pt last ov was 4/17/2024. Pt is inquiring about an alternative medication that could possibly be taken with his inhaler due to the inhaler not relieving the pt sx pt states he is still experiencing SOB. Please advise.

## 2024-05-03 NOTE — TELEPHONE ENCOUNTER
Patient ongoing shortness of breath/wheezing, would recommend utilizing a short course of steroid pack to see if this calms down any underlying respiratory irritation.  May continue to use albuterol inhaler as needed.  If symptoms persist, would recommend having consultation with pulmonologist.  Prescription sent to pharmacy

## 2024-05-07 ENCOUNTER — APPOINTMENT (OUTPATIENT)
Dept: ENDOCRINOLOGY | Facility: CLINIC | Age: 64
End: 2024-05-07
Payer: MEDICARE

## 2024-05-07 DIAGNOSIS — E55.9 VITAMIN D DEFICIENCY: ICD-10-CM

## 2024-05-07 DIAGNOSIS — E79.0 HYPERURICEMIA: Primary | ICD-10-CM

## 2024-05-07 RX ORDER — ALLOPURINOL 100 MG/1
100 TABLET ORAL DAILY
Qty: 90 TABLET | Refills: 1 | Status: SHIPPED | OUTPATIENT
Start: 2024-05-07 | End: 2024-11-03

## 2024-05-07 NOTE — RESULT ENCOUNTER NOTE
Allopurinol sent.  At this time, we will plan to monitor patient's blood work to see about effectiveness of medication to determine duration.  If effective and tolerated, would expect patient to be on long-term though patient can also help improve uric acid level with moderation of diet, particularly cutting back on red meats, seafood and alcohol.  Will have him continue with increased vitamin D.  Will plan to recheck a vitamin D and other blood work in 3 months with future lab orders placed

## 2024-05-08 ENCOUNTER — APPOINTMENT (OUTPATIENT)
Dept: SURGERY | Facility: CLINIC | Age: 64
End: 2024-05-08
Payer: MEDICARE

## 2024-05-09 ENCOUNTER — NUTRITION (OUTPATIENT)
Dept: SURGERY | Facility: CLINIC | Age: 64
End: 2024-05-09
Payer: MEDICARE

## 2024-05-09 ENCOUNTER — OFFICE VISIT (OUTPATIENT)
Dept: SURGERY | Facility: CLINIC | Age: 64
End: 2024-05-09
Payer: MEDICARE

## 2024-05-09 VITALS
RESPIRATION RATE: 16 BRPM | DIASTOLIC BLOOD PRESSURE: 85 MMHG | BODY MASS INDEX: 47.74 KG/M2 | SYSTOLIC BLOOD PRESSURE: 145 MMHG | HEART RATE: 91 BPM | HEIGHT: 68 IN | WEIGHT: 315 LBS

## 2024-05-09 VITALS — BODY MASS INDEX: 50.48 KG/M2 | WEIGHT: 315 LBS

## 2024-05-09 DIAGNOSIS — I42.2 HYPERTROPHIC CARDIOMYOPATHY (MULTI): Primary | ICD-10-CM

## 2024-05-09 DIAGNOSIS — M10.9 GOUTY ARTHRITIS: ICD-10-CM

## 2024-05-09 DIAGNOSIS — I10 ESSENTIAL HYPERTENSION: ICD-10-CM

## 2024-05-09 DIAGNOSIS — E55.9 VITAMIN D DEFICIENCY: ICD-10-CM

## 2024-05-09 DIAGNOSIS — R73.9 HYPERGLYCEMIA: ICD-10-CM

## 2024-05-09 DIAGNOSIS — E51.9 THIAMINE DEFICIENCY: ICD-10-CM

## 2024-05-09 DIAGNOSIS — I48.11 LONGSTANDING PERSISTENT ATRIAL FIBRILLATION (MULTI): ICD-10-CM

## 2024-05-09 DIAGNOSIS — G47.33 OBSTRUCTIVE SLEEP APNEA: ICD-10-CM

## 2024-05-09 DIAGNOSIS — E66.01 MORBID OBESITY WITH BMI OF 50.0-59.9, ADULT (MULTI): ICD-10-CM

## 2024-05-09 DIAGNOSIS — Z71.3 ENCOUNTER FOR NUTRITION EVALUATION PRIOR TO BARIATRIC SURGERY: ICD-10-CM

## 2024-05-09 DIAGNOSIS — Z86.718 PERSONAL HISTORY OF DVT (DEEP VEIN THROMBOSIS): ICD-10-CM

## 2024-05-09 DIAGNOSIS — Z95.810 ICD (IMPLANTABLE CARDIOVERTER-DEFIBRILLATOR) IN PLACE: ICD-10-CM

## 2024-05-09 DIAGNOSIS — D50.8 IRON DEFICIENCY ANEMIA SECONDARY TO INADEQUATE DIETARY IRON INTAKE: ICD-10-CM

## 2024-05-09 DIAGNOSIS — E63.9 NUTRITIONAL DISORDER: ICD-10-CM

## 2024-05-09 DIAGNOSIS — Z86.79 HISTORY OF VENTRICULAR TACHYCARDIA: ICD-10-CM

## 2024-05-09 DIAGNOSIS — E07.9 DISEASE OF THYROID GLAND: ICD-10-CM

## 2024-05-09 DIAGNOSIS — E53.8 B12 DEFICIENCY: ICD-10-CM

## 2024-05-09 DIAGNOSIS — E61.0 COPPER DEFICIENCY: ICD-10-CM

## 2024-05-09 LAB
FERRITIN SERPL-MCNC: 779 NG/ML (ref 30–400)
IRON SATN MFR SERPL: 45 % (ref 12–50)
IRON SERPL-MCNC: 112 UG/DL (ref 45–160)
TIBC SERPL-MCNC: 249 UG/DL (ref 228–428)
UIBC SERPL-MCNC: 137 UG/DL (ref 110–370)

## 2024-05-09 PROCEDURE — 3079F DIAST BP 80-89 MM HG: CPT | Performed by: INTERNAL MEDICINE

## 2024-05-09 PROCEDURE — 3077F SYST BP >= 140 MM HG: CPT | Performed by: INTERNAL MEDICINE

## 2024-05-09 PROCEDURE — 1036F TOBACCO NON-USER: CPT | Performed by: INTERNAL MEDICINE

## 2024-05-09 PROCEDURE — 99214 OFFICE O/P EST MOD 30 MIN: CPT | Performed by: INTERNAL MEDICINE

## 2024-05-09 PROCEDURE — 3008F BODY MASS INDEX DOCD: CPT | Performed by: INTERNAL MEDICINE

## 2024-05-09 RX ORDER — MELATONIN 10 MG
1 TABLET, SUBLINGUAL SUBLINGUAL 2 TIMES DAILY
COMMUNITY

## 2024-05-09 ASSESSMENT — ENCOUNTER SYMPTOMS
WEAKNESS: 0
DIARRHEA: 0
ARTHRALGIAS: 0
DEPRESSION: 0
FEVER: 0
DIFFICULTY URINATING: 0
OCCASIONAL FEELINGS OF UNSTEADINESS: 0
ABDOMINAL PAIN: 0
HEADACHES: 0
SHORTNESS OF BREATH: 1
LOSS OF SENSATION IN FEET: 0
NAUSEA: 0
CONSTIPATION: 0
CHILLS: 0
COUGH: 0
BACK PAIN: 1

## 2024-05-09 ASSESSMENT — PATIENT HEALTH QUESTIONNAIRE - PHQ9
SUM OF ALL RESPONSES TO PHQ9 QUESTIONS 1 AND 2: 0
2. FEELING DOWN, DEPRESSED OR HOPELESS: NOT AT ALL
1. LITTLE INTEREST OR PLEASURE IN DOING THINGS: NOT AT ALL

## 2024-05-09 ASSESSMENT — PAIN SCALES - GENERAL: PAINLEVEL: 0-NO PAIN

## 2024-05-09 NOTE — PATIENT INSTRUCTIONS
Counseled on low calorie diet and regular exercise. Follow up with cardiologist for preop clearance. Also need pulmonary clearance for  sleep apnea
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
## 2024-05-09 NOTE — PROGRESS NOTES
Initial Medical Weight Loss Appointment ([facility])     Starting Weight: 335 lbs.   Current Weight: 332 lbs.   Current BMI: 50.48     Diet Experience: Fernando has tried Nutrisystem 2 years ago and lost ~10-15 lbs at that time. Followed it for about 1 year, but then it became too expensive so stopped doing it. Pt has also seen a dietitian through  this past year and attended nutrition classes.   Fernando reports that this weight has remained relatively stable over the past 2 years. Weight fluctuates within 10 lbs. No significant changes.   Fernando reports that the lowest weight that he had been in awhile was noted at 286 lbs back in Feb 2022. This is when his father passed away and appetite reduced. He did feel better physically when he was 50 lbs lighter, pt states he was able to breathe better and better mobility.   Pt Seeking: unsure. Pt has another Bariatric consult scheduled with his wife in June.    Pertinent Co-morbidities: sleep apnea and does not use a cpap machine, htn and is on medication, prediabetes   Labs 5/3/2024: A1c 6.1 (H) , vitamin D 24 (L)   Current Daily Intake:   Breakfast (9-10am) - Sometimes will go out to get a breakfast sandwich from somewhere such as [employer]. Sometimes will go out to breakfast at a local restaurant. Pt notices that he tends to gain weight when he has pancakes for breakfast. Usually has water with his breakfast.   Lunch (3pm) - his wife may pick or mr. Wade or bring something home from fast food/takeout. Then he falls asleep because it makes him very sleepy.   Dinner- Chips or a snack in the evening  How many times do you order takeout, fast food and/or go out to eat per week? Most meals are going out - daily.   Snacks: Yes. Has cut back on candy.   Beverages: Water, a mini can of gingerale if his stomach hurts- 3X per week, fruit punch at [employer] (once every couple weeks).   Alcohol Intake: every once in awhille will have 1-2 beers at a time (less than once a month,  special occasions).   Food Allergies? NKFAS   Food Intolerances? None that he is aware of   Food Dislikes? Asparagus, zucchini, squash, joel greens   Do you eat when you are not hungry and/or when you feel full? Yes - his wife puts a lot on his plate   Do you eat when you are bored/stressed/emotional? None   Current Exercise/Exercise Hx: minimal, has a hard time walking - one knee is bad, also has poor endurance. The doctor prescribed him albuterol to help him breathe better.   Hours of sleep per night: not addressed at today's appointment   Work schedule: at home   Who is in the household? Him and his wife   Who does the cooking/grocery shopping? His wife does most of the cooking and grocery shopping. Pt states that she is on board and is supportive.   Obstacles to weight loss in the past? Portions and poor food choices, now limited mobility   Anything else relative to diet? His DrMarcos Recommended Bariatric surgery   Wants to cut back on bread.   What do you want to get out of surgery? To be healthier and to live longer, to have a better quality of life, he has 2 grandson (4yo and 8yo) and wants to watch them walk across the stage and to be able to things with them   Motivation to change (1-10):  Feeling motivated     Estimated ability to achieve goals: good     Today's Lesson: Review of Dr. Regan's lifelong rules, calorie counting, food label reading, promoting feelings of satiety, and energy balance.  Diet Goals: Practice the lifelong rules  Exercise Recommendations: Gradually work up to 150 minutes of moderate intensity exercise per week.  Behavior Changes: will be assessed every month  Behavior Goals: Goals were written down and provided for pt   Try anibal drops. Drink water, anibal or hint payne in place of pops, juices and smoothies.   2.   Start to have 3 meals per day - refer to meal options listed for pt   3.   Follow MyPlate method for meal structure. Plate was provided to pt.     Plan: Will follow up next  month. Will continue to monitor pt's weight, dietary & behavior changes.      Mayi Ma, MS, RD, LD

## 2024-05-30 ENCOUNTER — ANTICOAGULATION - WARFARIN VISIT (OUTPATIENT)
Dept: CARDIOLOGY | Facility: HOSPITAL | Age: 64
End: 2024-05-30
Payer: MEDICARE

## 2024-05-30 DIAGNOSIS — I82.4Y9 DEEP VEIN THROMBOSIS (DVT) OF PROXIMAL LOWER EXTREMITY, UNSPECIFIED CHRONICITY, UNSPECIFIED LATERALITY (MULTI): Primary | ICD-10-CM

## 2024-05-30 LAB
POC INR: 2.8
POC PROTHROMBIN TIME: NORMAL

## 2024-05-30 PROCEDURE — 85610 PROTHROMBIN TIME: CPT | Mod: QW

## 2024-05-30 PROCEDURE — 99211 OFF/OP EST MAY X REQ PHY/QHP: CPT | Performed by: FAMILY MEDICINE

## 2024-05-30 NOTE — PROGRESS NOTES
Patient identification verified with 2 identifiers.    Location: [facility] - 1st Floor Anticoagulation Clinic [address] [id number]    Referring Physician: Dr Koo  Enrollment/ Re-enrollment date:    INR Goal: 2.0-3.0  INR monitoring is per [facility] protocol.  Anticoagulation Medication: warfarin  Indication: Deep Vein Thrombosis (DVT)    Subjective   Bleeding signs/symptoms: No    Bruising: No   Major bleeding event: No  Thrombosis signs/symptoms: No  Thromboembolic event: No  Missed doses: No  Extra doses: No  Medication changes: Yes  started on a new gout medication  Dietary changes: No  Change in health: No  Change in activity: No  Alcohol: No  Other concerns: No    Upcoming Procedures:  Does the Patient Have any upcoming procedures that require interruption in anticoagulation therapy? no  Does the patient require bridging? no      Anticoagulation Summary  As of 2024      INR goal:  2.0-3.0   TTR:  98.8% (7.8 mo)   INR used for dosin.80 (2024)   Weekly warfarin total:  38 mg               Assessment/Plan   Therapeutic     1. New dose: no change    2. Next INR: 1 month      Education provided to patient during the visit:  Patient instructed to call in interim with questions, concerns and changes.

## 2024-06-06 ENCOUNTER — APPOINTMENT (OUTPATIENT)
Dept: SURGERY | Facility: CLINIC | Age: 64
End: 2024-06-06
Payer: MEDICARE

## 2024-06-25 ENCOUNTER — OFFICE VISIT (OUTPATIENT)
Dept: PRIMARY CARE | Facility: CLINIC | Age: 64
End: 2024-06-25
Payer: MEDICARE

## 2024-06-25 VITALS
OXYGEN SATURATION: 95 % | WEIGHT: 315 LBS | RESPIRATION RATE: 18 BRPM | BODY MASS INDEX: 47.74 KG/M2 | HEIGHT: 68 IN | HEART RATE: 82 BPM | TEMPERATURE: 96.2 F | SYSTOLIC BLOOD PRESSURE: 116 MMHG | DIASTOLIC BLOOD PRESSURE: 70 MMHG

## 2024-06-25 DIAGNOSIS — N44.2 TESTICULAR CYST: Primary | ICD-10-CM

## 2024-06-25 PROCEDURE — 1036F TOBACCO NON-USER: CPT | Performed by: FAMILY MEDICINE

## 2024-06-25 PROCEDURE — 99214 OFFICE O/P EST MOD 30 MIN: CPT | Performed by: FAMILY MEDICINE

## 2024-06-25 PROCEDURE — 3008F BODY MASS INDEX DOCD: CPT | Performed by: FAMILY MEDICINE

## 2024-06-25 PROCEDURE — 3078F DIAST BP <80 MM HG: CPT | Performed by: FAMILY MEDICINE

## 2024-06-25 PROCEDURE — 3074F SYST BP LT 130 MM HG: CPT | Performed by: FAMILY MEDICINE

## 2024-06-25 ASSESSMENT — LIFESTYLE VARIABLES
HOW OFTEN DO YOU HAVE A DRINK CONTAINING ALCOHOL: MONTHLY OR LESS
SKIP TO QUESTIONS 9-10: 1
HOW OFTEN DO YOU HAVE SIX OR MORE DRINKS ON ONE OCCASION: NEVER
AUDIT-C TOTAL SCORE: 1
HOW MANY STANDARD DRINKS CONTAINING ALCOHOL DO YOU HAVE ON A TYPICAL DAY: 1 OR 2

## 2024-06-25 ASSESSMENT — PATIENT HEALTH QUESTIONNAIRE - PHQ9
SUM OF ALL RESPONSES TO PHQ9 QUESTIONS 1 AND 2: 0
1. LITTLE INTEREST OR PLEASURE IN DOING THINGS: NOT AT ALL
2. FEELING DOWN, DEPRESSED OR HOPELESS: NOT AT ALL

## 2024-06-25 ASSESSMENT — PAIN SCALES - GENERAL: PAINLEVEL: 0-NO PAIN

## 2024-06-25 NOTE — PROGRESS NOTES
History Of Present Illness  Fernando Nation is a 64 y.o. male presenting for Mass (LUMP ON BOTH TESTICLE REQUESTING SPECIALIST REFERRAL. STATES HE FEELS IT IS GETTING BIGGER. )  .     he first noticed a palpable lump in his testicle a few years ago.  He had an ultrasound of his scrotum in July 2022 which showed bilateral small hydroceles and a right cyst.  He feels that in the last few weeks there is been a change, increased  size of his testicles.  He would like to seek further treatment for this.  He denies any fever, chills, redness, pain, penile discharge or dysuria or hematuria.         Past Medical History  Patient Active Problem List    Diagnosis Date Noted    Morbid obesity with BMI of 50.0-59.9, adult (Multi) 05/01/2024    Recurrent incisional hernia 05/01/2024    Prediabetes 04/17/2024    Atrial fibrillation (Multi) 01/30/2024    Cardiomyopathy (Multi) 01/30/2024    Congestive heart failure (Multi) 01/30/2024    Fatty liver 01/30/2024    History of hyperthyroidism 01/30/2024    Osteoarthritis of left knee 01/30/2024    Palpitations 01/30/2024    Stage 3 chronic kidney disease (Multi) 01/30/2024    Systolic dysfunction 01/30/2024    Hyperthyroidism 01/30/2024    Intertrigo 11/30/2023    Chronic diastolic heart failure (Multi) 11/27/2023    Vitamin D deficiency 06/26/2023    Gastroesophageal reflux disease 04/07/2023    Gout 04/07/2023    Hypercholesteremia 08/29/2018    Personal history of DVT (deep vein thrombosis) 08/29/2018    Cardiomyopathy, hypertrophic nonobstructive (Multi) 11/18/2016    Calculus of kidney 02/23/2016    Implantable cardioverter-defibrillator (ICD) in situ 11/30/2015    Essential hypertension 05/11/2014    Obstructive sleep apnea of adult 12/27/2011    Morbid (severe) obesity due to excess calories (Multi) 02/12/2009        Medications  Current Outpatient Medications on File Prior to Visit   Medication Sig    albuterol 90 mcg/actuation inhaler Inhale 2 puffs every 6 hours if needed  for wheezing.    allopurinol (Zyloprim) 100 mg tablet Take 1 tablet (100 mg) by mouth once daily.    amiodarone (Pacerone) 200 mg tablet once every 24 hours.    cholecalciferol, vitamin D3, 250 mcg (10,000 unit) tablet Take 1 tablet (250 mcg) by mouth 2 times a day.    furosemide (Lasix) 40 mg tablet Take 1 tablet (40 mg) by mouth once daily. (Patient taking differently: Take 1 tablet (40 mg) by mouth 2 times a day.)    lovastatin (Mevacor) 20 mg tablet Take 1 tablet (20 mg) by mouth once daily at bedtime.    metoprolol tartrate (Lopressor) 25 mg tablet Take 1 tablet (25 mg) by mouth every 12 hours.    NON FORMULARY HANDICAPPED PARKING PLACARD (5 YR) 5 YEARS    warfarin (Coumadin) 2 mg tablet Sun and thrus 2 mg x2 tabs    warfarin (Coumadin) 6 mg tablet Take 1 tablet (6 mg) by mouth 5 times a week.     No current facility-administered medications on file prior to visit.        Surgical History  He has a past surgical history that includes Other surgical history (10/03/2016); Colonoscopy (02/07/2017); Other surgical history (08/15/2022); and Other surgical history (08/15/2022).     Social History  He reports that he has never smoked. He has never been exposed to tobacco smoke. He has never used smokeless tobacco. He reports current alcohol use. He reports that he does not use drugs.    Family History  Family History   Problem Relation Name Age of Onset    Cancer Mother      Other (HTN) Father      Blood clot Father      Diabetes Father      Other (MORBID OBESITY) Father      Cancer Other      Heart disease Other          Allergies  Patient has no known allergies.    Immunizations  Immunization History   Administered Date(s) Administered    Flu vaccine, quadrivalent, no egg protein, age 6 month or greater (FLUCELVAX) 11/11/2021    Influenza, Unspecified 11/27/2009    Influenza, injectable, MDCK, quadrivalent 10/17/2019    Influenza, injectable, quadrivalent 11/10/2016, 09/28/2018    Influenza, seasonal, injectable  "10/01/2010, 09/28/2011, 10/02/2012    Moderna SARS-CoV-2 Vaccination 03/17/2021, 04/21/2021    PPD Test 11/17/2009    Pneumococcal polysaccharide vaccine, 23-valent, age 2 years and older (PNEUMOVAX 23) 11/27/2009, 11/17/2015, 10/17/2019        ROS  Negative, except as discussed in HPI     Vitals  /70   Pulse 82   Temp 35.7 °C (96.2 °F)   Resp 18   Ht 1.727 m (5' 8\")   Wt (!) 151 kg (333 lb 8 oz)   SpO2 95%   BMI 50.71 kg/m²      Physical Exam  Vitals and nursing note reviewed.   Genitourinary:     Comments:   Patient deferred  Neurological:      General: No focal deficit present.      Mental Status: He is alert.         Relevant Results  Lab Results   Component Value Date    WBC 3.8 (L) 05/03/2024    WBC 4.1 (L) 06/26/2023    HGB 15.0 05/03/2024    HGB 15.9 06/26/2023    HCT 47.6 05/03/2024    HCT 49.9 06/26/2023    MCV 93 05/03/2024    MCV 93.4 06/26/2023     05/03/2024     06/26/2023     Lab Results   Component Value Date     05/03/2024     06/26/2023    K 3.7 05/03/2024    K SAMPLE HEMOLYZED TO BE RECOLLECTED 06/26/2023    K 4.3 06/26/2023     05/03/2024     06/26/2023    CO2 25 05/03/2024    CO2 25 06/26/2023    BUN 16 05/03/2024    BUN 15 06/26/2023    CREATININE 1.40 05/03/2024    CREATININE 1.5 06/26/2023    CALCIUM 9.4 05/03/2024    CALCIUM 9.3 06/26/2023    PROT 6.7 05/03/2024    PROT 7.0 06/26/2023    BILITOT 1.1 05/03/2024    BILITOT 0.8 06/26/2023    ALKPHOS 67 05/03/2024    ALKPHOS 80 06/26/2023    ALT 21 05/03/2024    ALT SAMPLE HEMOLYZED TO BE RECOLLECTED 06/26/2023    ALT 20 06/26/2023    AST 21 05/03/2024    AST SAMPLE HEMOLYZED TO BE RECOLLECTED 06/26/2023    AST 21 06/26/2023    GLUCOSE 94 05/03/2024    GLUCOSE 96 06/26/2023     Lab Results   Component Value Date    HGBA1C 6.1 (H) 05/03/2024     Lab Results   Component Value Date    TSH 1.74 05/03/2024    FREET4 6.0 (H) 11/17/2021      Lab Results   Component Value Date    CHOL 154 05/03/2024    " TRIG 77 05/03/2024    HDL 69.0 05/03/2024           Assessment/Plan   Fernando was seen today for mass.  Diagnoses and all orders for this visit:  Testicular cyst (Primary)  -     US scrotum; Future  -     Referral to Urology; Future         Counseling:   Medication education:   -Education:  The patient is counseled regarding potential side-effects of any and all new medications  -Understanding:  Patient expressed understanding of information discussed today  -Adherence:  No barriers to adherence identified    Final treatment plan is a result of shared decision making with patient.         Serjio Chavez MD

## 2024-06-26 ENCOUNTER — APPOINTMENT (OUTPATIENT)
Dept: PRIMARY CARE | Facility: CLINIC | Age: 64
End: 2024-06-26
Payer: MEDICARE

## 2024-06-26 ENCOUNTER — HOSPITAL ENCOUNTER (OUTPATIENT)
Dept: RADIOLOGY | Facility: CLINIC | Age: 64
Discharge: HOME | End: 2024-06-26
Payer: MEDICARE

## 2024-06-26 DIAGNOSIS — N44.2 TESTICULAR CYST: ICD-10-CM

## 2024-06-26 PROCEDURE — 76870 US EXAM SCROTUM: CPT | Performed by: RADIOLOGY

## 2024-06-26 PROCEDURE — 76870 US EXAM SCROTUM: CPT

## 2024-06-28 ENCOUNTER — APPOINTMENT (OUTPATIENT)
Dept: PRIMARY CARE | Facility: CLINIC | Age: 64
End: 2024-06-28
Payer: MEDICARE

## 2024-07-01 ENCOUNTER — APPOINTMENT (OUTPATIENT)
Dept: PRIMARY CARE | Facility: CLINIC | Age: 64
End: 2024-07-01
Payer: MEDICARE

## 2024-07-16 NOTE — PROGRESS NOTES
outpatient Visit Note    Chief Complaint   Patient presents with    Follow-up     3 month f/u         HPI:  Fernando Nation is a 64 y.o. male with PMH significant for CAD/cardiomyopathy and atrial fibrillation with implanted defibrillator, gout, hypertension, hyperlipidemia, hypothyroidism, prediabetes and history of prior demyelinating/Guillain-Calhoun type syndrome who presents to the office for follow-up.  He was last seen in the office on 4/17/2024 for follow-up.  Most recently, he was seen on 6/23/2024 by Dr. Chavez secondary to testicular complaints.  At that time he noted changes in chronic bilateral hydroceles and right testicular cyst.  Plan was set for formal urology consultation and repeat ultrasound, having had imaging previously in 2022.  Ultrasound was successfully completed in interval noting bilateral hydroceles and bilateral epididymal cysts.    Notes ongoing nodule on scrotal surface which occasionally becomes inflamed and irritated.  Most recent irritation managed with Epsom soaks which did resolve discomfort.  States that the palpable lump remains present.    Last panel blood work completed on 5/3/2024 including TSH, CMP, CBC, PSA, A1c, hepatitis C screening, uric acid, vitamin D, ferritin panel.  Blood work was remarkable for CKD stage IIIa with GFR of 57, leukopenia consistent with patient's baseline, A1c of 6.1% which was improved from 6.3% in January, elevated uric acid level at 8.4, vitamin D deficiency and elevated ferritin with normal iron.    Has been working with bariatric surgery/medical weight loss team in interval.  Has momentarily halted going forward with bariatric secondary to expressed concerns from his wife regarding his ability to tolerate procedure with his cardiac history.  Did have appointment scheduled for the 2 to meet with surgeon to discuss concerns though this was canceled.  Does continue to wait different options for weight loss with preference for medication  management versus surgery though insurance continues to limit options.    CAD/CHF/hypertension/hyperlipidemia/A-fib:  Patient continues have established relationship with cardiology at [facility].  He does regularly proceed to Coumadin clinic for INR checks.  Has been compliant with amiodarone, Lasix, lisinopril-hydrochlorothiazide, metoprolol and lovastatin regimen.  Denies any adverse side effects.  No reports of chest pain, shortness of breath, lightheadedness or dizziness.    Has been in contact with cardiology team as his battery warning has been going off on his defibrillator.  Does have cardiology follow-up scheduled for tomorrow as well as tentative battery replacement scheduled for September.  No reports of chest pain, lightheadedness or dizziness.    Prediabetes:  No reports of polyuria, polydipsia, polyphagia or acute vision/sensation changes.    Of note, patient has reported history of hypothyroidism with TSH levels stable on most recent blood work without active supplementation.    Patient additionally has history of gout which is traditionally been managed with dietary modification.  Uric acid level did show elevation to which patient is currently on allopurinol 100 daily.  Denies any recent gout flares.    Does have known history of VENECIA to which he does follow with ENT/sleep medicine.  Does have occasional shortness of breath and wheezing which has historically been managed with albuterol inhaler.    Current Medications  Current Outpatient Medications   Medication Instructions    albuterol 90 mcg/actuation inhaler 2 puffs, inhalation, Every 6 hours PRN    allopurinol (ZYLOPRIM) 100 mg, oral, Daily    amiodarone (Pacerone) 200 mg tablet Every 24 hours    cholecalciferol, vitamin D3, 250 mcg (10,000 unit) tablet 1 tablet, oral, 2 times daily    furosemide (LASIX) 40 mg, oral, Daily    lovastatin (MEVACOR) 20 mg, oral, Nightly    metoprolol tartrate (Lopressor) 25 mg tablet 1 tablet, oral, Every 12 hours    NON  FORMULARY HANDICAPPED PARKING PLACARD (5 YR) 5 YEARS<BR>    warfarin (Coumadin) 2 mg tablet Sun and thrus 2 mg x2 tabs    warfarin (COUMADIN) 6 mg, oral, 5 times weekly        Allergies  No Known Allergies     Past Medical History:   Diagnosis Date    Asthma (HHS-HCC)     Blood vessel disorder     Heart disease, unspecified     Systolic dysfunction    Other headache syndrome     Headache syndrome    Personal history of other diseases of the circulatory system     History of cardiac disorder    Personal history of other diseases of the circulatory system     History of hypertension    Personal history of other diseases of the digestive system     History of gastroesophageal reflux (GERD)    Personal history of other diseases of the musculoskeletal system and connective tissue     History of gout    Personal history of other drug therapy     History of anticoagulant therapy    Personal history of other endocrine, nutritional and metabolic disease     History of hyperlipidemia    Personal history of other endocrine, nutritional and metabolic disease     History of vitamin D deficiency    Personal history of other endocrine, nutritional and metabolic disease     History of hyperthyroidism    Personal history of other endocrine, nutritional and metabolic disease     History of thyroid nodule    Personal history of other specified conditions     History of urinary hesitancy    Personal history of pneumonia (recurrent)     History of pneumonia    Personal history of pulmonary embolism     History of pulmonary embolism    Presence of automatic (implantable) cardiac defibrillator     Cardiac defibrillator in place      Past Surgical History:   Procedure Laterality Date    COLONOSCOPY  02/07/2017    Complete Colonoscopy    OTHER SURGICAL HISTORY  10/03/2016    Cardio-defib Pulse Generator Implantation Date    OTHER SURGICAL HISTORY  08/15/2022    Hernia repair    OTHER SURGICAL HISTORY  08/15/2022    Colonoscopy     Family  History   Problem Relation Name Age of Onset    Cancer Mother      Other (HTN) Father      Blood clot Father      Diabetes Father      Other (MORBID OBESITY) Father      Cancer Other      Heart disease Other       Social History     Tobacco Use    Smoking status: Never     Passive exposure: Never    Smokeless tobacco: Never   Vaping Use    Vaping status: Never Used   Substance Use Topics    Alcohol use: Yes     Comment: DRINKS ONCE A YR AT [employer]    Drug use: Never       ROS  All pertinent positive symptoms are included in the history of present illness.  All other systems have been reviewed and are negative and noncontributory to this patient's current ailments.    VITAL SIGNS  Vitals:    07/17/24 0834   BP: 118/82   Pulse: 76   Temp: 35.8 °C (96.4 °F)   SpO2: 96%         PHYSICAL EXAM  GENERAL APPEARANCE: alert and oriented, Pleasant and cooperative, No Acute Distress  HEENT: EOMI, PERRLA, MMM  HEART: RRR, normal S1S2, no murmurs, click or rubs  LUNGS: clear to auscultation bilaterally, no wheezes/rhonchi/rales  EXTREMITIES: no edema, normal ROM  SKIN: normal, no rash, unremarkable  NEUROLOGIC EXAM: non-focal exam  MUSCULOSKELETAL: no gross abnormalities  PSYCH: affect is normal, eye contact is good      Assessment/Plan   Problem List Items Addressed This Visit             ICD-10-CM    Chronic diastolic heart failure (Multi) I50.32     - Continue with current medication regimen and routine cardiology follow-up per protocol         Obstructive sleep apnea of adult G47.33     - Continue with CPAP use and specialist per protocol         Atrial fibrillation (Multi) - Primary I48.91     - Continue on current medication regimen and routine cardiology follow-up per protocol         Implantable cardioverter-defibrillator (ICD) in situ Z95.810     - Please being continued communication with cardiology team, particularly with battery concerns         Cardiomyopathy (Multi) I42.9     - Stable on current medication  which we will continue with current via cardiology         Essential hypertension (Chronic) I10     - Blood pressure stable on current regimen         Fatty liver K76.0     - Liver enzymes stable on most recent blood work completed in May         Gastroesophageal reflux disease K21.9     - Stable with no active use of medication  -Continue to focus on moderation of triggers such as greasy, spicy and acidic foods         Gout M10.9     - Uric acid level elevated on most recent blood work  -Will continue to utilize allopurinol monitoring uric acid counts, occurrence of gout flares and kidney function  -Continue moderate red meat, seafood and alcohol intake         History of hyperthyroidism Z86.39     - Thyroid level stable on most recent blood work         Hypercholesteremia E78.00     - Cholesterol panel stable to which we will continue on current statin regimen along with healthy, low-fat diet         Morbid (severe) obesity due to excess calories (Multi) E66.01     - Will continue with medical weight loss/bariatric team per protocol         Personal history of DVT (deep vein thrombosis) Z86.718     - Continue with Coumadin therapy and routine INR checks         Stage 3 chronic kidney disease (Multi) N18.30     - Kidney function stable to baseline  -Continue to focus on good daily hydration         Vitamin D deficiency E55.9     - Most recent blood work did show persisting vitamin D deficiency to which we will continue on daily supplementation         Prediabetes R73.03     - A1c sugar average did improve going from 6.3% in January to 6.1%  -Continue with dietary modification         Epidermoid cyst of skin of scrotum L72.0     - skin cyst stable  - continue with supportive care (soaks/warm compression) as needed            Please plan to have routine blood work completed after first of the month      Counseling:       Medication education:         Education:  The patient is counseled regarding potential  side-effects of all new medications        Understanding:  Patient expressed understanding        Adherence:  No barriers to adherence identified

## 2024-07-17 ENCOUNTER — OFFICE VISIT (OUTPATIENT)
Dept: PRIMARY CARE | Facility: CLINIC | Age: 64
End: 2024-07-17
Payer: MEDICARE

## 2024-07-17 VITALS
WEIGHT: 315 LBS | OXYGEN SATURATION: 96 % | BODY MASS INDEX: 47.74 KG/M2 | HEIGHT: 68 IN | DIASTOLIC BLOOD PRESSURE: 82 MMHG | TEMPERATURE: 96.4 F | SYSTOLIC BLOOD PRESSURE: 118 MMHG | HEART RATE: 76 BPM

## 2024-07-17 DIAGNOSIS — N18.30 STAGE 3 CHRONIC KIDNEY DISEASE, UNSPECIFIED WHETHER STAGE 3A OR 3B CKD (MULTI): ICD-10-CM

## 2024-07-17 DIAGNOSIS — E55.9 VITAMIN D DEFICIENCY: ICD-10-CM

## 2024-07-17 DIAGNOSIS — I50.32 CHRONIC DIASTOLIC HEART FAILURE (MULTI): ICD-10-CM

## 2024-07-17 DIAGNOSIS — R73.03 PREDIABETES: ICD-10-CM

## 2024-07-17 DIAGNOSIS — E78.00 HYPERCHOLESTEREMIA: ICD-10-CM

## 2024-07-17 DIAGNOSIS — I10 ESSENTIAL HYPERTENSION: Chronic | ICD-10-CM

## 2024-07-17 DIAGNOSIS — K76.0 FATTY LIVER: ICD-10-CM

## 2024-07-17 DIAGNOSIS — G47.33 OBSTRUCTIVE SLEEP APNEA OF ADULT: ICD-10-CM

## 2024-07-17 DIAGNOSIS — Z86.718 PERSONAL HISTORY OF DVT (DEEP VEIN THROMBOSIS): ICD-10-CM

## 2024-07-17 DIAGNOSIS — Z95.810 IMPLANTABLE CARDIOVERTER-DEFIBRILLATOR (ICD) IN SITU: ICD-10-CM

## 2024-07-17 DIAGNOSIS — K21.9 GASTROESOPHAGEAL REFLUX DISEASE WITHOUT ESOPHAGITIS: ICD-10-CM

## 2024-07-17 DIAGNOSIS — M1A.9XX0 CHRONIC GOUT WITHOUT TOPHUS, UNSPECIFIED CAUSE, UNSPECIFIED SITE: ICD-10-CM

## 2024-07-17 DIAGNOSIS — E66.01 MORBID (SEVERE) OBESITY DUE TO EXCESS CALORIES (MULTI): ICD-10-CM

## 2024-07-17 DIAGNOSIS — Z86.39 HISTORY OF HYPERTHYROIDISM: ICD-10-CM

## 2024-07-17 DIAGNOSIS — L72.0 EPIDERMOID CYST OF SKIN OF SCROTUM: ICD-10-CM

## 2024-07-17 DIAGNOSIS — I48.91 ATRIAL FIBRILLATION, UNSPECIFIED TYPE (MULTI): Primary | ICD-10-CM

## 2024-07-17 DIAGNOSIS — I42.9 CARDIOMYOPATHY, UNSPECIFIED TYPE (MULTI): ICD-10-CM

## 2024-07-17 PROCEDURE — 1036F TOBACCO NON-USER: CPT | Performed by: FAMILY MEDICINE

## 2024-07-17 PROCEDURE — 3079F DIAST BP 80-89 MM HG: CPT | Performed by: FAMILY MEDICINE

## 2024-07-17 PROCEDURE — 3008F BODY MASS INDEX DOCD: CPT | Performed by: FAMILY MEDICINE

## 2024-07-17 PROCEDURE — 3074F SYST BP LT 130 MM HG: CPT | Performed by: FAMILY MEDICINE

## 2024-07-17 PROCEDURE — 99213 OFFICE O/P EST LOW 20 MIN: CPT | Performed by: FAMILY MEDICINE

## 2024-07-17 ASSESSMENT — PAIN SCALES - GENERAL: PAINLEVEL: 0-NO PAIN

## 2024-07-17 NOTE — PATIENT INSTRUCTIONS
Problem List Items Addressed This Visit             ICD-10-CM    Chronic diastolic heart failure (Multi) I50.32     - Continue with current medication regimen and routine cardiology follow-up per protocol         Obstructive sleep apnea of adult G47.33     - Continue with CPAP use and specialist per protocol         Atrial fibrillation (Multi) - Primary I48.91     - Continue on current medication regimen and routine cardiology follow-up per protocol         Implantable cardioverter-defibrillator (ICD) in situ Z95.810     - Please being continued communication with cardiology team, particularly with battery concerns         Cardiomyopathy (Multi) I42.9     - Stable on current medication which we will continue with current via cardiology         Essential hypertension (Chronic) I10     - Blood pressure stable on current regimen         Fatty liver K76.0     - Liver enzymes stable on most recent blood work completed in May         Gastroesophageal reflux disease K21.9     - Stable with no active use of medication  -Continue to focus on moderation of triggers such as greasy, spicy and acidic foods         Gout M10.9     - Uric acid level elevated on most recent blood work  -Will continue to utilize allopurinol monitoring uric acid counts, occurrence of gout flares and kidney function  -Continue moderate red meat, seafood and alcohol intake         History of hyperthyroidism Z86.39     - Thyroid level stable on most recent blood work         Hypercholesteremia E78.00     - Cholesterol panel stable to which we will continue on current statin regimen along with healthy, low-fat diet         Morbid (severe) obesity due to excess calories (Multi) E66.01     - Will continue with medical weight loss/bariatric team per protocol         Personal history of DVT (deep vein thrombosis) Z86.718     - Continue with Coumadin therapy and routine INR checks         Stage 3 chronic kidney disease (Multi) N18.30     - Kidney function  stable to baseline  -Continue to focus on good daily hydration         Vitamin D deficiency E55.9     - Most recent blood work did show persisting vitamin D deficiency to which we will continue on daily supplementation         Prediabetes R73.03     - A1c sugar average did improve going from 6.3% in January to 6.1%  -Continue with dietary modification         Epidermoid cyst of skin of scrotum L72.0     - skin cyst stable  - continue with supportive care (soaks/warm compression) as needed            Please plan to have routine blood work completed after first of the month      Counseling:       Medication education:         Education:  The patient is counseled regarding potential side-effects of all new medications        Understanding:  Patient expressed understanding        Adherence:  No barriers to adherence identified

## 2024-07-17 NOTE — ASSESSMENT & PLAN NOTE
- Stable with no active use of medication  -Continue to focus on moderation of triggers such as greasy, spicy and acidic foods

## 2024-07-17 NOTE — ASSESSMENT & PLAN NOTE
- Most recent blood work did show persisting vitamin D deficiency to which we will continue on daily supplementation

## 2024-07-17 NOTE — ASSESSMENT & PLAN NOTE
- Uric acid level elevated on most recent blood work  -Will continue to utilize allopurinol monitoring uric acid counts, occurrence of gout flares and kidney function  -Continue moderate red meat, seafood and alcohol intake

## 2024-07-17 NOTE — ASSESSMENT & PLAN NOTE
- A1c sugar average did improve going from 6.3% in January to 6.1%  -Continue with dietary modification

## 2024-07-17 NOTE — ASSESSMENT & PLAN NOTE
- Cholesterol panel stable to which we will continue on current statin regimen along with healthy, low-fat diet

## 2024-07-18 ENCOUNTER — ANTICOAGULATION - WARFARIN VISIT (OUTPATIENT)
Dept: CARDIOLOGY | Facility: HOSPITAL | Age: 64
End: 2024-07-18
Payer: MEDICARE

## 2024-07-18 ENCOUNTER — APPOINTMENT (OUTPATIENT)
Dept: CARDIOLOGY | Facility: HOSPITAL | Age: 64
End: 2024-07-18
Payer: MEDICARE

## 2024-07-18 DIAGNOSIS — I82.4Y9 DEEP VEIN THROMBOSIS (DVT) OF PROXIMAL LOWER EXTREMITY, UNSPECIFIED CHRONICITY, UNSPECIFIED LATERALITY (MULTI): Primary | ICD-10-CM

## 2024-07-18 LAB
POC INR: 2
POC PROTHROMBIN TIME: NORMAL

## 2024-07-18 PROCEDURE — 99211 OFF/OP EST MAY X REQ PHY/QHP: CPT | Performed by: FAMILY MEDICINE

## 2024-07-18 PROCEDURE — 85610 PROTHROMBIN TIME: CPT | Mod: QW

## 2024-07-18 NOTE — PROGRESS NOTES
Patient identification verified with 2 identifiers.    Location: [facility] - 1st Floor Anticoagulation Clinic [address] [id number]    Referring Physician: DR hilario  Enrollment/ Re-enrollment date:    INR Goal: 2.0-3.0  INR monitoring is per [facility] protocol.  Anticoagulation Medication: warfarin  Indication: Deep Vein Thrombosis (DVT)    Subjective   Bleeding signs/symptoms: No    Bruising: No   Major bleeding event: No  Thrombosis signs/symptoms: No  Thromboembolic event: No  Missed doses: No  Extra doses: No  Medication changes: No  Dietary changes: No  Change in health: No  Change in activity: No  Alcohol: No  Other concerns: No    Upcoming Procedures:  Does the Patient Have any upcoming procedures that require interruption in anticoagulation therapy? no  Does the patient require bridging? no      Anticoagulation Summary  As of 2024      INR goal:  2.0-3.0   TTR:  99.0% (9.5 mo)   INR used for dosin.00 (2024)   Weekly warfarin total:  38 mg               Assessment/Plan   Therapeutic     1. New dose: no change    2. Next INR: 1 month      Education provided to patient during the visit:  Patient instructed to call in interim with questions, concerns and changes.

## 2024-07-31 ENCOUNTER — APPOINTMENT (OUTPATIENT)
Dept: UROLOGY | Facility: CLINIC | Age: 64
End: 2024-07-31
Payer: MEDICARE

## 2024-07-31 VITALS — HEIGHT: 69 IN | WEIGHT: 315 LBS | TEMPERATURE: 97.8 F | BODY MASS INDEX: 46.65 KG/M2

## 2024-07-31 DIAGNOSIS — N43.3 HYDROCELE, UNSPECIFIED HYDROCELE TYPE: Primary | ICD-10-CM

## 2024-07-31 DIAGNOSIS — N44.2 TESTICULAR CYST: ICD-10-CM

## 2024-07-31 PROCEDURE — 1036F TOBACCO NON-USER: CPT | Performed by: UROLOGY

## 2024-07-31 PROCEDURE — 3008F BODY MASS INDEX DOCD: CPT | Performed by: UROLOGY

## 2024-07-31 PROCEDURE — 99204 OFFICE O/P NEW MOD 45 MIN: CPT | Performed by: UROLOGY

## 2024-07-31 ASSESSMENT — PAIN SCALES - GENERAL: PAINLEVEL: 0-NO PAIN

## 2024-07-31 NOTE — PROGRESS NOTES
"HPI:  64 y.o. yo male patient complains of scrotal pain, with a hx of CAD, atrial fibrillation, gout, HTN, hyperlipidemia, hypothyroidism and pre diabetes.     #Scrotal pain   How long has this been going on- a few years  which side/ or both- bilateral  where is pain located- around the lumps on the testicle  any noticeable swelling- yes  what was tried to relieve pain- nothing  History of UTI/ STD exposure- no  History of vasectomy-  no    Lab Results   Component Value Date    PSA 0.4 10/06/2022     No components found for: \"CBC\"  Lab Results   Component Value Date    HGBA1C 6.1 (H) 05/03/2024       No results found for the last 90 days.        === 06/26/24 ===    US SCROTUM - personally reviewed/interpreted    - Impression -  Small bilateral epididymal cysts.    Small bilateral hydroceles.    MACRO:  None    Signed by: Will Chowdhury 6/27/2024 2:38 PM  Dictation workstation:   [id number]  PMH:  Past Medical History:   Diagnosis Date    Asthma ([facility])     Blood vessel disorder     Heart disease, unspecified     Systolic dysfunction    Other headache syndrome     Headache syndrome    Personal history of other diseases of the circulatory system     History of cardiac disorder    Personal history of other diseases of the circulatory system     History of hypertension    Personal history of other diseases of the digestive system     History of gastroesophageal reflux (GERD)    Personal history of other diseases of the musculoskeletal system and connective tissue     History of gout    Personal history of other drug therapy     History of anticoagulant therapy    Personal history of other endocrine, nutritional and metabolic disease     History of hyperlipidemia    Personal history of other endocrine, nutritional and metabolic disease     History of vitamin D deficiency    Personal history of other endocrine, nutritional and metabolic disease     History of hyperthyroidism    Personal history of other endocrine, " First OB prenatal education message sent via the portal.  Third Trimester Education sent/delayed to approximately 28 weeks.  Postpartum PT info sent to patient/delayed to approximately 28 weeks.  Babyscripts order placed prior to Nurse Navigator visit.     nutritional and metabolic disease     History of thyroid nodule    Personal history of other specified conditions     History of urinary hesitancy    Personal history of pneumonia (recurrent)     History of pneumonia    Personal history of pulmonary embolism     History of pulmonary embolism    Presence of automatic (implantable) cardiac defibrillator     Cardiac defibrillator in place        PSH:  Past Surgical History:   Procedure Laterality Date    COLONOSCOPY  02/07/2017    Complete Colonoscopy    OTHER SURGICAL HISTORY  10/03/2016    Cardio-defib Pulse Generator Implantation Date    OTHER SURGICAL HISTORY  08/15/2022    Hernia repair    OTHER SURGICAL HISTORY  08/15/2022    Colonoscopy        Medications:    Current Outpatient Medications:     albuterol 90 mcg/actuation inhaler, Inhale 2 puffs every 6 hours if needed for wheezing., Disp: 18 g, Rfl: 3    allopurinol (Zyloprim) 100 mg tablet, Take 1 tablet (100 mg) by mouth once daily., Disp: 90 tablet, Rfl: 1    amiodarone (Pacerone) 200 mg tablet, once every 24 hours., Disp: , Rfl:     cholecalciferol, vitamin D3, 250 mcg (10,000 unit) tablet, Take 1 tablet (250 mcg) by mouth 2 times a day., Disp: , Rfl:     furosemide (Lasix) 40 mg tablet, Take 1 tablet (40 mg) by mouth once daily. (Patient taking differently: Take 1 tablet (40 mg) by mouth 2 times a day.), Disp: 90 tablet, Rfl: 0    lovastatin (Mevacor) 20 mg tablet, Take 1 tablet (20 mg) by mouth once daily at bedtime., Disp: 90 tablet, Rfl: 1    metoprolol tartrate (Lopressor) 25 mg tablet, Take 1 tablet (25 mg) by mouth every 12 hours., Disp: , Rfl:     NON FORMULARY, HANDICAPPED PARKING PLACARD (5 YR) 5 YEARS, Disp: , Rfl:     warfarin (Coumadin) 2 mg tablet, Sun and thrus 2 mg x2 tabs, Disp: , Rfl:     warfarin (Coumadin) 6 mg tablet, Take 1 tablet (6 mg) by mouth 5 times a week., Disp: , Rfl:     Allergy:  No Known Allergies     Exam  Testicles descended bilaterally, nontender, no masses  Vasa palpable  bilaterally  Varicocele: No  Penis circ'd, no lesions, no plaques      Assessment/Plan  #Hydrocele/epididymal cysts  -Discussed options for hydrocele, specifically, observation, aspiration and hydrocelectomy  -Discussed conservative measures like nsaids, elevation, ice, tight underwear etc.  -given size of these lesions, would not rec surgery at this time    Follow up as needed, warning signs reviewed    Scribe Attestation  By signing my name below, I, Jackelyn Rico Scribshyann   attest that this documentation has been prepared under the direction and in the presence of Isrrael Mosqueda MD.

## 2024-08-29 ENCOUNTER — ANTICOAGULATION - WARFARIN VISIT (OUTPATIENT)
Dept: CARDIOLOGY | Facility: HOSPITAL | Age: 64
End: 2024-08-29
Payer: MEDICARE

## 2024-08-29 DIAGNOSIS — I82.4Y9 DEEP VEIN THROMBOSIS (DVT) OF PROXIMAL LOWER EXTREMITY, UNSPECIFIED CHRONICITY, UNSPECIFIED LATERALITY (MULTI): Primary | ICD-10-CM

## 2024-08-29 LAB
POC INR: 2.1
POC PROTHROMBIN TIME: NORMAL

## 2024-08-29 PROCEDURE — 85610 PROTHROMBIN TIME: CPT | Mod: QW

## 2024-08-29 PROCEDURE — 99211 OFF/OP EST MAY X REQ PHY/QHP: CPT | Performed by: FAMILY MEDICINE

## 2024-08-29 NOTE — PROGRESS NOTES
Patient identification verified with 2 identifiers.    Location: [facility] - 1st Floor Anticoagulation Clinic [address] [id number]    Referring Physician: DR Tavares  Enrollment/ Re-enrollment date:    INR Goal: 2.0-3.0  INR monitoring is per [facility] protocol.  Anticoagulation Medication: warfarin  Indication: Atrial Fibrillation/Atrial Flutter    Subjective   Bleeding signs/symptoms: No    Bruising: No   Major bleeding event: No  Thrombosis signs/symptoms: No  Thromboembolic event: No  Missed doses: No  Extra doses: No  Medication changes: No  Dietary changes: No  Change in health: No  Change in activity: No  Alcohol: No  Other concerns: No    Upcoming Procedures:  Does the Patient Have any upcoming procedures that require interruption in anticoagulation therapy? no  Does the patient require bridging? no      Anticoagulation Summary  As of 2024      INR goal:  2.0-3.0   TTR:  99.2% (10.9 mo)   INR used for dosin.10 (2024)   Weekly warfarin total:  38 mg               Assessment/Plan   Therapeutic     1. New dose: no change    2. Next INR: 1 month      Education provided to patient during the visit:  Patient instructed to call in interim with questions, concerns and changes.

## 2024-09-11 DIAGNOSIS — I48.91 ATRIAL FIBRILLATION, UNSPECIFIED TYPE (MULTI): Primary | ICD-10-CM

## 2024-09-11 NOTE — TELEPHONE ENCOUNTER
Requesting 90 day supply of warfarin 2 mg   DVT ppx: SCDs, pharm ppx on hold given hematuria on admission  DIET: DASH  DISPO: Home

## 2024-09-13 RX ORDER — WARFARIN 2 MG/1
TABLET ORAL
Qty: 48 TABLET | Refills: 1 | Status: SHIPPED | OUTPATIENT
Start: 2024-09-13

## 2024-09-20 DIAGNOSIS — J45.20 MILD INTERMITTENT REACTIVE AIRWAY DISEASE WITHOUT COMPLICATION (HHS-HCC): ICD-10-CM

## 2024-09-20 RX ORDER — ALBUTEROL SULFATE 90 UG/1
2 INHALANT RESPIRATORY (INHALATION) EVERY 6 HOURS PRN
Qty: 18 G | Refills: 3 | Status: SHIPPED | OUTPATIENT
Start: 2024-09-20

## 2024-09-20 NOTE — TELEPHONE ENCOUNTER
Refill:  albuterol 90 mcg/actuation inhaler Inhale 2 puffs every 6 hours if needed for wheezing.     Pharm:  [facility]

## 2024-09-23 ENCOUNTER — OFFICE VISIT (OUTPATIENT)
Dept: URGENT CARE | Age: 64
End: 2024-09-23
Payer: MEDICARE

## 2024-09-23 ENCOUNTER — TELEPHONE (OUTPATIENT)
Dept: PRIMARY CARE | Facility: CLINIC | Age: 64
End: 2024-09-23
Payer: MEDICARE

## 2024-09-23 VITALS
HEIGHT: 68 IN | HEART RATE: 74 BPM | TEMPERATURE: 96.9 F | OXYGEN SATURATION: 95 % | BODY MASS INDEX: 47.74 KG/M2 | SYSTOLIC BLOOD PRESSURE: 108 MMHG | RESPIRATION RATE: 20 BRPM | WEIGHT: 315 LBS | DIASTOLIC BLOOD PRESSURE: 59 MMHG

## 2024-09-23 DIAGNOSIS — M10.071 ACUTE IDIOPATHIC GOUT OF RIGHT ANKLE: Primary | ICD-10-CM

## 2024-09-23 PROCEDURE — 99203 OFFICE O/P NEW LOW 30 MIN: CPT | Performed by: FAMILY MEDICINE

## 2024-09-23 PROCEDURE — 3008F BODY MASS INDEX DOCD: CPT | Performed by: FAMILY MEDICINE

## 2024-09-23 PROCEDURE — 3078F DIAST BP <80 MM HG: CPT | Performed by: FAMILY MEDICINE

## 2024-09-23 PROCEDURE — 1036F TOBACCO NON-USER: CPT | Performed by: FAMILY MEDICINE

## 2024-09-23 PROCEDURE — 3074F SYST BP LT 130 MM HG: CPT | Performed by: FAMILY MEDICINE

## 2024-09-23 RX ORDER — PREDNISONE 20 MG/1
TABLET ORAL
Qty: 20 TABLET | Refills: 0 | Status: SHIPPED | OUTPATIENT
Start: 2024-09-23

## 2024-09-23 NOTE — PATIENT INSTRUCTIONS
You have acute gout in the right ankle.  In addition you have significant swelling in the dorsum of your right foot.  Please increase your oral fluids for the next 7-10 days  Please take prednisone as a single dose early in the day with food  May use Tylenol 325 mg, one or 2 tablets by mouth every 4-6 hours as needed for additional pain relief  Hold allopurinol until episode completely resolves.  As we discussed, discuss with provider whether additional testing might be necessary given your poor response to allopurinol  This note was generated by voice recognition software. Minor transcription/grammat what is just got pretty much everything ical errors may be present. Please call for clarification.

## 2024-09-23 NOTE — TELEPHONE ENCOUNTER
I am not sure what to make of the patient's complaint.  If he is truly been having swelling in his foot for nearly 4 months.  I do not know why he is not reached out to the office before hand.  Plus I saw patient in July and there was no complaint at this time.  Why does patient think that his symptoms are connected to the allopurinol?  There is the potential of him having a gout flare which is not impossible on the allopurinol.  We do not have a good gauge on whether the dose is enough for him to optimize prevention of gout as he did not do the blood work that I had previously ordered to check his uric acid level.  If patient is having significant symptoms, he can proceed to urgent care/ER for evaluation.  As per patient being upset that I am not in the office today, I am sorry for his inconvenience though it is not as if the patient had a scheduled appointment that had to be changed last minute.  I will be in the office the rest of the week and we can coordinate an appointment in the near future if he is needed to be seen.

## 2024-09-23 NOTE — TELEPHONE ENCOUNTER
Pt called stating his foot started swelling after starting taking allopurinol and has been getting progressively worse and is unable to walk. Review of chart shows the last refill sent was in May. He states he thought this medication was supposed to help with the swelling. Elevating has helped but walking aggravates it more. Has had pain for about a week. He states he stopped the medication on Sat. Pt advised that PCP is not in the office and was upset stating that he feels like Dr Pelaez is not available anytime he calls. I let pt know I would send the message over to Dr Pelaez high priority but unfortunately there was nothing else I would be able to do.

## 2024-09-24 NOTE — TELEPHONE ENCOUNTER
Thank you for the update.  Hopefully the prednisone should help with improving his symptoms.  I agree that he very well may need a different dose of allopurinol.  Alternative formulations of the medication may be warranted as well.

## 2024-09-24 NOTE — TELEPHONE ENCOUNTER
Spoke with pt and he states he went to  and they had told him that there are different types of gout and that allopurinol might not be the right medication for him and may need an alt or that he would need to be on a different dose. I advised pt that he would need to get the blood work done for uric acid levels to make sure we have the correct dosing for him. He states he will not be able to get blood work done until he is feeling better. He was prescribed prednisone to help with the flare up. Pt states he has not had a gout flare up for years until he started taking this medication.

## 2024-10-08 ENCOUNTER — TELEPHONE (OUTPATIENT)
Dept: PRIMARY CARE | Facility: CLINIC | Age: 64
End: 2024-10-08
Payer: MEDICARE

## 2024-10-08 DIAGNOSIS — I48.91 ATRIAL FIBRILLATION, UNSPECIFIED TYPE (MULTI): Primary | ICD-10-CM

## 2024-10-08 ASSESSMENT — ENCOUNTER SYMPTOMS
CONSTIPATION: 0
DYSURIA: 0
FEVER: 0
WHEEZING: 0
SHORTNESS OF BREATH: 0
RHINORRHEA: 0
VOMITING: 0
CHILLS: 0
CHEST TIGHTNESS: 0
DIARRHEA: 0
MYALGIAS: 0
WOUND: 0
FREQUENCY: 0
NECK STIFFNESS: 0
BACK PAIN: 0
JOINT SWELLING: 1
SORE THROAT: 0
HEADACHES: 0
ARTHRALGIAS: 1
COLOR CHANGE: 1
NAUSEA: 0

## 2024-10-08 NOTE — PROGRESS NOTES
Subjective   Patient ID: Fernando Nation is a 64 y.o. male.    HPI: 64-year-old male presents with complaint of gout in his right foot for the past week.  He reports personal history of gout in other joints as well as this foot in the past.  Vital signs are reviewed. Alert and oriented x3 with normal mood and affect  Patient is well nourished, well-developed, alert and in no acute distress  No pain to palpation over frontal, ethmoid or maxillary sinus areas    External eyes, orbits, conjunctiva and eyelids are normal in appearance  Pupils are equal, round, reactive to light and accommodation, extraocular movements intact    External ears appear normal  External canals are normal in appearance  Right tympanic membrane is intact and pearly gray in appearance  Left tympanic membrane is intact and pearly gray in appearance  There is no middle ear effusion noted on the right  There is no middle ear effusion noted on the left  External appearance of the nose is normal  Nasal mucosa, septum, turbinates are pink in appearance  There is no nasal discharge in both nares    Oral mucosa is uniformly pink and moist  Palate is pink, symmetric and intact  Tongue is moist, mobile and midline  Tonsils are present, not enlarged, not erythematous with no concretions or exudates present  No cervical lymphadenopathy palpated    Heart has regular rate and rhythm. No murmurs, rubs or gallops are auscultated at this exam.    Respiratory rate rhythm and effort are normal. Breath sounds bilaterally are clear on auscultation without crackles, rhonchi, wheezes or friction rub.    Abdomen: Normal bowel sounds on auscultation. Soft, nontender without rebound or rigidity on palpation        Vital signs are reviewed. Alert and oriented x3 with normal mood and affect  Patient is well nourished, well-developed, alert and in no acute distress  No pain to palpation over frontal, ethmoid or maxillary sinus areas    External eyes, orbits, conjunctiva  and eyelids are normal in appearance  Pupils are equal, round, reactive to light and accommodation, extraocular movements intact    External ears appear normal  External canals are normal in appearance  Right tympanic membrane is intact and pearly gray in appearance  Left tympanic membrane is intact and pearly gray in appearance  There is no middle ear effusion noted on the right  There is no middle ear effusion noted on the left  External appearance of the nose is normal  Nasal mucosa, septum, turbinates are pink in appearance  There is no nasal discharge in both nares    Oral mucosa is uniformly pink and moist  Palate is pink, symmetric and intact  Tongue is moist, mobile and midline  Tonsils are present, not enlarged, not erythematous with no concretions or exudates present  No cervical lymphadenopathy palpated    Heart has regular rate and rhythm. No murmurs, rubs or gallops are auscultated at this exam.    Respiratory rate rhythm and effort are normal. Breath sounds bilaterally are clear on auscultation without crackles, rhonchi, wheezes or friction rub.    Abdomen: Normal bowel sounds on auscultation. Soft, nontender without rebound or rigidity on palpation                  The following portions of the chart were reviewed this encounter and updated as appropriate:  Tobacco  Allergies  Meds  Problems  Med Hx  Surg Hx  Fam Hx         Review of Systems   Constitutional:  Negative for chills and fever.   HENT:  Negative for congestion, ear pain, rhinorrhea and sore throat.    Respiratory:  Negative for chest tightness, shortness of breath and wheezing.    Gastrointestinal:  Negative for constipation, diarrhea, nausea and vomiting.   Genitourinary:  Negative for dysuria and frequency.   Musculoskeletal:  Positive for arthralgias, gait problem and joint swelling. Negative for back pain, myalgias and neck stiffness.   Skin:  Positive for color change. Negative for wound.   Neurological:  Negative for  headaches.     Objective   Vital signs are reviewed. Alert and oriented x3 with normal mood and affect  Patient is well nourished, well-developed, alert and in no acute distress    External eyes, orbits, conjunctiva and eyelids are normal in appearance  Pupils are equal, round, reactive to light and accommodation, extraocular movements intact    External ears appear normal  External canals are normal in appearance  Right tympanic membrane is intact and pale gray in appearance  Left tympanic membrane is intact and pale gray in appearance  There is no middle ear effusion noted on the right  There is no middle ear effusion noted on the left  External appearance of the nose is normal  Nasal mucosa, septum, turbinates are reddened and swollen in appearance  There is thin white posterior pharynx nasal discharge in both nares    Oral mucosa is uniformly pink and moist  Palate is pink, symmetric and intact  Tongue is moist, mobile and midline  Posterior pharynx not erythematous with no concretions or exudates present  No cervical lymphadenopathy palpated    Heart has regular rate and rhythm. No murmurs, rubs or gallops are auscultated at this exam.    Respiratory rate rhythm and effort are normal. Breath sounds bilaterally are clear on auscultation without crackles, rhonchi, wheezes or friction rub.    Abdomen: Normal bowel sounds on auscultation. Soft, nontender without rebound or rigidity on palpation    Extremities: Right foot: Patient has severe pain with even slight movement of the ankle joint.  Pain also extends into the tarsal bone and base of the tarsometatarsal joint.  There is no deformity.  Area is warm to the touch and slightly reddened.          Procedures    Assessment/Plan   Diagnoses and all orders for this visit:  Acute idiopathic gout of right ankle  -     predniSONE (Deltasone) 20 mg tablet; 3 tabs p.o. daily x3, 2 tabs  p.o. daily x3, 1 tab p.o. daily x3, one half tab p.o. daily x4    Patient  disposition: Home

## 2024-10-08 NOTE — TELEPHONE ENCOUNTER
Pt is calling regarding his RX for Warfarin,pt is completely out and would like to know why his medication is being changed, please call pt as he is very upset.

## 2024-10-09 DIAGNOSIS — I48.91 ATRIAL FIBRILLATION, UNSPECIFIED TYPE (MULTI): ICD-10-CM

## 2024-10-09 RX ORDER — WARFARIN 2 MG/1
2 TABLET ORAL SEE ADMIN INSTRUCTIONS
Qty: 204 TABLET | Refills: 3 | Status: SHIPPED | OUTPATIENT
Start: 2024-10-09 | End: 2025-10-09

## 2024-10-09 RX ORDER — WARFARIN 6 MG/1
6 TABLET ORAL
Qty: 60 TABLET | Refills: 3 | Status: SHIPPED | OUTPATIENT
Start: 2024-10-09 | End: 2024-10-09

## 2024-10-09 RX ORDER — WARFARIN 2 MG/1
2 TABLET ORAL SEE ADMIN INSTRUCTIONS
Qty: 204 TABLET | Refills: 0 | Status: SHIPPED | OUTPATIENT
Start: 2024-10-09 | End: 2024-10-09 | Stop reason: SDUPTHER

## 2024-10-09 RX ORDER — WARFARIN 2 MG/1
2 TABLET ORAL SEE ADMIN INSTRUCTIONS
Qty: 204 TABLET | Refills: 3 | Status: SHIPPED | OUTPATIENT
Start: 2024-10-10 | End: 2024-10-09 | Stop reason: SDUPTHER

## 2024-10-09 NOTE — TELEPHONE ENCOUNTER
Fernando called in because he said nobody has called him. I let him know that a new RX was sent to the pharmacy and that it will be filled tomorrow.

## 2024-10-09 NOTE — TELEPHONE ENCOUNTER
I spoke with patient and he does not have a prescription for 6 mg tablets. His rx is only for 2 mg and he takes 3 of them on the days he is supposed to take 6 mg and takes one on the other 2 days of the week.  Please call him when a corrected rx for only 2 mg tabs is sent to pharmacy, patient does not want 6 mg tabs.

## 2024-10-09 NOTE — TELEPHONE ENCOUNTER
Review of chart shows pt is on coumadin 2mg twice a week and 6mg 5 days a week. Last refill of 2mg was 9/13/24. Last INR was 2.1. His chart also shows pt sees coumadin clinic. He does have an upcoming appt with them tomorrow 10/10/24 and with us 10/17/24. He is also scheduled for a NP visit with Dr Hollins 11/18/24.    See see also Med Refill TE 10/8/24.

## 2024-10-10 ENCOUNTER — ANTICOAGULATION - WARFARIN VISIT (OUTPATIENT)
Dept: CARDIOLOGY | Facility: HOSPITAL | Age: 64
End: 2024-10-10
Payer: MEDICARE

## 2024-10-10 DIAGNOSIS — I82.4Y9 DEEP VEIN THROMBOSIS (DVT) OF PROXIMAL LOWER EXTREMITY, UNSPECIFIED CHRONICITY, UNSPECIFIED LATERALITY (MULTI): ICD-10-CM

## 2024-10-10 LAB
POC INR: 3.3
POC PROTHROMBIN TIME: NORMAL

## 2024-10-10 PROCEDURE — 85610 PROTHROMBIN TIME: CPT | Mod: QW

## 2024-10-10 PROCEDURE — 99211 OFF/OP EST MAY X REQ PHY/QHP: CPT | Performed by: FAMILY MEDICINE

## 2024-10-10 NOTE — PROGRESS NOTES
Patient identification verified with 2 identifiers.    [facility] 1st [facility] Anticoagulation Clinic [address] [id number]    Referring Physician: DR Tavares  Enrollment/ Re-enrollment date: 2024   INR Goal: 2.0-3.0  INR monitoring is per [facility] protocol.  Anticoagulation Medication: warfarin  Indication: Deep Vein Thrombosis (DVT)    Subjective   Bleeding signs/symptoms: No    Bruising: No   Major bleeding event: No  Thrombosis signs/symptoms: No  Thromboembolic event: No  Missed doses: No  Extra doses: No  Medication changes: No  Dietary changes: No  Change in health: No  Change in activity: No  Alcohol: No  Other concerns: No    Upcoming Procedures:  Does the Patient Have any upcoming procedures that require interruption in anticoagulation therapy? no  Does the patient require bridging? no      Anticoagulation Summary  As of 10/10/2024      INR goal:  2.0-3.0   TTR:  96.4% (1 y)   INR used for dosing:  3.30 (10/10/2024)   Weekly warfarin total:  38 mg               Assessment/Plan   Therapeutic     1. New dose: no change    2. Next INR: 1 month      Education provided to patient during the visit:  Patient instructed to call in interim with questions, concerns and changes.

## 2024-10-10 NOTE — TELEPHONE ENCOUNTER
Patient was contacted directly by myself on 10/9/2024 to clarify issue.  He reiterated that he has historically used 2 mg Coumadin tablets for his entire regimen, taking 3 tablets at a time for his 6 mg doses.  New prescription was sent in the system requested that it be filled on 10/10/2024.  Did send new prescription reiterating timeline for prescription to be filled on 10/9/2024 so patient can have same-day access to his medication and take his dose as scheduled.  He is sent for routine INR check with Coumadin clinic.  Will be seen in the office next week as scheduled.  Patient understood and agreed with plan

## 2024-10-17 ENCOUNTER — APPOINTMENT (OUTPATIENT)
Dept: PRIMARY CARE | Facility: CLINIC | Age: 64
End: 2024-10-17
Payer: MEDICARE

## 2024-11-07 DIAGNOSIS — E79.0 HYPERURICEMIA: ICD-10-CM

## 2024-11-07 RX ORDER — METOPROLOL SUCCINATE 100 MG/1
1 TABLET, EXTENDED RELEASE ORAL
COMMUNITY
Start: 2024-09-09

## 2024-11-07 RX ORDER — ALLOPURINOL 100 MG/1
100 TABLET ORAL DAILY
Qty: 90 TABLET | Refills: 1 | Status: SHIPPED | OUTPATIENT
Start: 2024-11-07

## 2024-11-07 RX ORDER — MUPIROCIN 20 MG/G
OINTMENT TOPICAL
COMMUNITY
Start: 2023-11-30

## 2024-11-13 ENCOUNTER — ANTICOAGULATION - WARFARIN VISIT (OUTPATIENT)
Dept: CARDIOLOGY | Facility: HOSPITAL | Age: 64
End: 2024-11-13
Payer: MEDICARE

## 2024-11-13 DIAGNOSIS — I82.4Y9 DEEP VEIN THROMBOSIS (DVT) OF PROXIMAL LOWER EXTREMITY, UNSPECIFIED CHRONICITY, UNSPECIFIED LATERALITY (MULTI): ICD-10-CM

## 2024-11-13 LAB
POC INR: 2.8
POC PROTHROMBIN TIME: NORMAL

## 2024-11-13 PROCEDURE — 85610 PROTHROMBIN TIME: CPT | Mod: QW

## 2024-11-13 PROCEDURE — 99211 OFF/OP EST MAY X REQ PHY/QHP: CPT | Performed by: FAMILY MEDICINE

## 2024-11-13 NOTE — PROGRESS NOTES
Patient identification verified with 2 identifiers.    Location: [facility] - 1st Floor Anticoagulation Clinic [address] [id number]    Referring Physician: DR Tavares  Enrollment/ Re-enrollment date: 2025   INR Goal: 2.0-3.0  INR monitoring is per [facility] protocol.  Anticoagulation Medication: warfarin  Indication: Deep Vein Thrombosis (DVT)    Subjective   Bleeding signs/symptoms: No    Bruising: No   Major bleeding event: No  Thrombosis signs/symptoms: No  Thromboembolic event: No  Missed doses: No  Extra doses: No  Medication changes: No  Dietary changes: No  Change in health: No  Change in activity: No  Alcohol: No  Other concerns: No    Upcoming Procedures:  Does the Patient Have any upcoming procedures that require interruption in anticoagulation therapy? no  Does the patient require bridging? no      Anticoagulation Summary  As of 11/13/2024      INR goal:  2.0-3.0   TTR:  96.4% (1 y)   INR used for dosing:  --               Assessment/Plan   Therapeutic     1. New dose: no change    2. Next INR: 1 month      Education provided to patient during the visit:  Patient instructed to call in interim with questions, concerns and changes.

## 2024-11-18 ENCOUNTER — APPOINTMENT (OUTPATIENT)
Dept: PRIMARY CARE | Facility: CLINIC | Age: 64
End: 2024-11-18
Payer: MEDICARE

## 2024-11-18 ENCOUNTER — LAB (OUTPATIENT)
Dept: LAB | Facility: LAB | Age: 64
End: 2024-11-18
Payer: MEDICARE

## 2024-11-18 VITALS
HEART RATE: 84 BPM | SYSTOLIC BLOOD PRESSURE: 117 MMHG | BODY MASS INDEX: 47.74 KG/M2 | WEIGHT: 315 LBS | DIASTOLIC BLOOD PRESSURE: 78 MMHG | HEIGHT: 68 IN

## 2024-11-18 DIAGNOSIS — Z95.810 IMPLANTABLE CARDIOVERTER-DEFIBRILLATOR (ICD) IN SITU: ICD-10-CM

## 2024-11-18 DIAGNOSIS — E79.0 HYPERURICEMIA: ICD-10-CM

## 2024-11-18 DIAGNOSIS — G47.33 OBSTRUCTIVE SLEEP APNEA OF ADULT: ICD-10-CM

## 2024-11-18 DIAGNOSIS — Z13.29 SCREENING FOR THYROID DISORDER: ICD-10-CM

## 2024-11-18 DIAGNOSIS — I10 ESSENTIAL HYPERTENSION: Chronic | ICD-10-CM

## 2024-11-18 DIAGNOSIS — N18.30 STAGE 3 CHRONIC KIDNEY DISEASE, UNSPECIFIED WHETHER STAGE 3A OR 3B CKD (MULTI): ICD-10-CM

## 2024-11-18 DIAGNOSIS — D70.8 OTHER NEUTROPENIA (CMS-HCC): ICD-10-CM

## 2024-11-18 DIAGNOSIS — Z23 ENCOUNTER FOR PREVNAR PNEUMOCOCCAL VACCINATION: ICD-10-CM

## 2024-11-18 DIAGNOSIS — Z12.5 SCREENING FOR PROSTATE CANCER: ICD-10-CM

## 2024-11-18 DIAGNOSIS — J45.20 MILD INTERMITTENT REACTIVE AIRWAY DISEASE WITHOUT COMPLICATION (HHS-HCC): ICD-10-CM

## 2024-11-18 DIAGNOSIS — R73.9 HYPERGLYCEMIA: ICD-10-CM

## 2024-11-18 DIAGNOSIS — Z79.01 WARFARIN ANTICOAGULATION: ICD-10-CM

## 2024-11-18 DIAGNOSIS — I50.32 CHRONIC DIASTOLIC HEART FAILURE: ICD-10-CM

## 2024-11-18 DIAGNOSIS — I48.91 ATRIAL FIBRILLATION, UNSPECIFIED TYPE (MULTI): ICD-10-CM

## 2024-11-18 DIAGNOSIS — I42.9 CARDIOMYOPATHY, UNSPECIFIED TYPE (MULTI): ICD-10-CM

## 2024-11-18 DIAGNOSIS — I42.2 CARDIOMYOPATHY, HYPERTROPHIC NONOBSTRUCTIVE (MULTI): ICD-10-CM

## 2024-11-18 DIAGNOSIS — Z86.718 PERSONAL HISTORY OF DVT (DEEP VEIN THROMBOSIS): ICD-10-CM

## 2024-11-18 DIAGNOSIS — Z00.00 MEDICARE ANNUAL WELLNESS VISIT, INITIAL: Chronic | ICD-10-CM

## 2024-11-18 DIAGNOSIS — Z00.00 MEDICARE ANNUAL WELLNESS VISIT, INITIAL: Primary | Chronic | ICD-10-CM

## 2024-11-18 LAB
ALBUMIN SERPL BCP-MCNC: 4.1 G/DL (ref 3.4–5)
ALP SERPL-CCNC: 66 U/L (ref 33–136)
ALT SERPL W P-5'-P-CCNC: 18 U/L (ref 10–52)
ANION GAP SERPL CALC-SCNC: 16 MMOL/L (ref 10–20)
AST SERPL W P-5'-P-CCNC: 17 U/L (ref 9–39)
BILIRUB SERPL-MCNC: 1 MG/DL (ref 0–1.2)
BUN SERPL-MCNC: 14 MG/DL (ref 6–23)
CALCIUM SERPL-MCNC: 9.5 MG/DL (ref 8.6–10.6)
CHLORIDE SERPL-SCNC: 103 MMOL/L (ref 98–107)
CHOLEST SERPL-MCNC: 129 MG/DL (ref 0–199)
CHOLESTEROL/HDL RATIO: 2.1
CO2 SERPL-SCNC: 29 MMOL/L (ref 21–32)
CREAT SERPL-MCNC: 1.36 MG/DL (ref 0.5–1.3)
EGFRCR SERPLBLD CKD-EPI 2021: 58 ML/MIN/1.73M*2
EST. AVERAGE GLUCOSE BLD GHB EST-MCNC: 126 MG/DL
GLUCOSE SERPL-MCNC: 92 MG/DL (ref 74–99)
HBA1C MFR BLD: 6 %
HDLC SERPL-MCNC: 62.9 MG/DL
LDLC SERPL CALC-MCNC: 50 MG/DL
NON HDL CHOLESTEROL: 66 MG/DL (ref 0–149)
POTASSIUM SERPL-SCNC: 4.1 MMOL/L (ref 3.5–5.3)
PROT SERPL-MCNC: 6.9 G/DL (ref 6.4–8.2)
PSA SERPL-MCNC: 0.75 NG/ML
SODIUM SERPL-SCNC: 144 MMOL/L (ref 136–145)
TRIGL SERPL-MCNC: 81 MG/DL (ref 0–149)
TSH SERPL-ACNC: 1.23 MIU/L (ref 0.44–3.98)
VLDL: 16 MG/DL (ref 0–40)

## 2024-11-18 PROCEDURE — 80053 COMPREHEN METABOLIC PANEL: CPT

## 2024-11-18 PROCEDURE — G0439 PPPS, SUBSEQ VISIT: HCPCS | Performed by: INTERNAL MEDICINE

## 2024-11-18 PROCEDURE — 99214 OFFICE O/P EST MOD 30 MIN: CPT | Performed by: INTERNAL MEDICINE

## 2024-11-18 PROCEDURE — G0103 PSA SCREENING: HCPCS

## 2024-11-18 PROCEDURE — 1036F TOBACCO NON-USER: CPT | Performed by: INTERNAL MEDICINE

## 2024-11-18 PROCEDURE — 83036 HEMOGLOBIN GLYCOSYLATED A1C: CPT

## 2024-11-18 PROCEDURE — 3078F DIAST BP <80 MM HG: CPT | Performed by: INTERNAL MEDICINE

## 2024-11-18 PROCEDURE — 84443 ASSAY THYROID STIM HORMONE: CPT

## 2024-11-18 PROCEDURE — 93000 ELECTROCARDIOGRAM COMPLETE: CPT | Performed by: INTERNAL MEDICINE

## 2024-11-18 PROCEDURE — 3074F SYST BP LT 130 MM HG: CPT | Performed by: INTERNAL MEDICINE

## 2024-11-18 PROCEDURE — 36415 COLL VENOUS BLD VENIPUNCTURE: CPT

## 2024-11-18 PROCEDURE — 3008F BODY MASS INDEX DOCD: CPT | Performed by: INTERNAL MEDICINE

## 2024-11-18 PROCEDURE — 90677 PCV20 VACCINE IM: CPT | Performed by: INTERNAL MEDICINE

## 2024-11-18 PROCEDURE — G2211 COMPLEX E/M VISIT ADD ON: HCPCS | Performed by: INTERNAL MEDICINE

## 2024-11-18 PROCEDURE — 80061 LIPID PANEL: CPT

## 2024-11-18 PROCEDURE — G0009 ADMIN PNEUMOCOCCAL VACCINE: HCPCS | Performed by: INTERNAL MEDICINE

## 2024-11-18 RX ORDER — ALBUTEROL SULFATE 90 UG/1
2 INHALANT RESPIRATORY (INHALATION) EVERY 6 HOURS PRN
Qty: 18 G | Refills: 3 | Status: CANCELLED | OUTPATIENT
Start: 2024-11-18

## 2024-11-18 RX ORDER — METOPROLOL SUCCINATE 100 MG/1
100 TABLET, EXTENDED RELEASE ORAL
Status: CANCELLED | OUTPATIENT
Start: 2024-11-18

## 2024-11-18 RX ORDER — LOVASTATIN 20 MG/1
20 TABLET ORAL NIGHTLY
Qty: 90 TABLET | Refills: 1 | Status: CANCELLED | OUTPATIENT
Start: 2024-11-18 | End: 2025-05-17

## 2024-11-18 RX ORDER — ALLOPURINOL 100 MG/1
100 TABLET ORAL DAILY
Qty: 90 TABLET | Refills: 1 | Status: CANCELLED | OUTPATIENT
Start: 2024-11-18

## 2024-11-18 ASSESSMENT — ACTIVITIES OF DAILY LIVING (ADL)
GROCERY_SHOPPING: INDEPENDENT
MANAGING_FINANCES: INDEPENDENT
BATHING: INDEPENDENT
TAKING_MEDICATION: INDEPENDENT
DRESSING: INDEPENDENT
DRESSING: INDEPENDENT
BATHING: INDEPENDENT
DOING_HOUSEWORK: INDEPENDENT

## 2024-11-18 ASSESSMENT — ENCOUNTER SYMPTOMS
LOSS OF SENSATION IN FEET: 0
DEPRESSION: 0
OCCASIONAL FEELINGS OF UNSTEADINESS: 0
CONSTITUTIONAL NEGATIVE: 1

## 2024-11-18 NOTE — PROGRESS NOTES
"Patient ID: Fernando Nation is a 64 y.o. male who presents for Medicare Annual Wellness Visit Initial.    /78   Pulse 84   Ht 1.727 m (5' 8\")   Wt (!) 150 kg (331 lb)   BMI 50.33 kg/m²     HPI      THIS PT IS BACK TO MY PRACTICE   AFTER 2 YEARS , REVIEWED , RECORD FROM HIS  PREVIOUS PCP   REVIEWED CARDIOLOGY NOTE FROM Psychiatric     I REVIEWED RECORD FROM COAGULATION CLINIC       ATRIAL FIBRILLATION   NO SYNCOPE , OCCASIONALLY   FEELING DIZZY , NO LIGHTHEADEDNESS  NO PALPITATION , NO HEADACHE   ON WARFARIN  4MG  ( 2MG X2)EVERY SUNDAY , THURSDAY   6MG (2MG X3) ALL OTHER DAYS   NEXT INR 12/18/2024   RECENT INR ON 11/13/2024 2.8   HE WAS GETTING HIS INR BLOOD DRAW  AT [facility]         CARDIOMYOPATHY STABLE   NO CP, SOB , NO PND , NO ORTHOPNEA  NO SYNCOPE   HE SEE CARDIOLOGY AT Psychiatric   HCM ( MY BPC3 gene)       IMPLANTABLE CARDIODEFIBRILLATOR     PERSONAL HX OF  DVT   ON WARFARIN       OBSTRUCTIVE SLEEP APNEA   ON bipap       CKD STAGE 3B       Subjective     Review of Systems   Constitutional: Negative.    All other systems reviewed and are negative.      Objective     Physical Exam  Vitals reviewed.   Constitutional:       Appearance: He is obese.   Neck:      Vascular: No carotid bruit.   Cardiovascular:      Rate and Rhythm: Normal rate. Rhythm irregular.      Pulses: Normal pulses.      Heart sounds: Normal heart sounds. No murmur heard.  Pulmonary:      Effort: Pulmonary effort is normal.      Breath sounds: Normal breath sounds.   Abdominal:      Palpations: There is no mass.      Hernia: No hernia is present.   Musculoskeletal:      Right lower leg: Edema present.      Left lower leg: Edema present.   Skin:     Capillary Refill: Capillary refill takes more than 3 seconds.   Neurological:      General: No focal deficit present.      Mental Status: He is oriented to person, place, and time. Mental status is at baseline.   Psychiatric:         Mood and Affect: Mood normal.         " Behavior: Behavior normal.         Thought Content: Thought content normal.         Judgment: Judgment normal.         Lab Results   Component Value Date    WBC 3.8 (L) 05/03/2024    HGB 15.0 05/03/2024    HCT 47.6 05/03/2024    MCV 93 05/03/2024     05/03/2024           Problem List Items Addressed This Visit       Chronic diastolic heart failure    Obstructive sleep apnea of adult    Atrial fibrillation (Multi)    Implantable cardioverter-defibrillator (ICD) in situ    Cardiomyopathy    Essential hypertension (Chronic)    Relevant Orders    Comprehensive metabolic panel    Personal history of DVT (deep vein thrombosis)    Stage 3 chronic kidney disease (Multi)    Relevant Orders    Comprehensive metabolic panel    Other neutropenia (CMS-HCC)     Other Visit Diagnoses       Medicare annual wellness visit, initial  (Chronic)   -  Primary    Relevant Orders    Comprehensive metabolic panel    Lipid panel    Screening for thyroid disorder        Relevant Orders    Tsh With Reflex To Free T4 If Abnormal    Hyperglycemia        Relevant Orders    Hemoglobin A1c    Warfarin anticoagulation        Cardiomyopathy, hypertrophic nonobstructive (Multi)        Mild intermittent reactive airway disease without complication ([facility]-HCC)        Hyperuricemia                  A/P       CMP , LIPID, PSA , COLOGUARD ,EKG ( REVIEWED NSR NO CHANGE COMPARED TO LAST TIME)  EKG, PNEUMOVAX 20   The effect of morbid obesity on overall all-cause mortality  Including physical mental wellbeing.  Effect on cardiovascular and cerebrovascular health, effect on obstructive sleep apnea effect on diabetes, meds including atrial fibrillation sudden death  Cancer/malignancy, chronic pain, effect on kidney health  WILL FOLLOW UP WITH ME IN 6MONTHS TIME

## 2024-12-06 ENCOUNTER — TELEPHONE (OUTPATIENT)
Dept: PRIMARY CARE | Facility: CLINIC | Age: 64
End: 2024-12-06
Payer: MEDICARE

## 2024-12-08 DIAGNOSIS — I51.9 HEART DISEASE: Primary | ICD-10-CM

## 2024-12-09 ENCOUNTER — TELEPHONE (OUTPATIENT)
Dept: PRIMARY CARE | Facility: CLINIC | Age: 64
End: 2024-12-09

## 2024-12-09 DIAGNOSIS — I51.9 HEART DISEASE: Primary | ICD-10-CM

## 2024-12-18 ENCOUNTER — ANTICOAGULATION - WARFARIN VISIT (OUTPATIENT)
Dept: CARDIOLOGY | Facility: HOSPITAL | Age: 64
End: 2024-12-18
Payer: MEDICARE

## 2024-12-18 DIAGNOSIS — I82.4Y9 DEEP VEIN THROMBOSIS (DVT) OF PROXIMAL LOWER EXTREMITY, UNSPECIFIED CHRONICITY, UNSPECIFIED LATERALITY (MULTI): Primary | ICD-10-CM

## 2024-12-18 LAB
POC INR: 2.2
POC PROTHROMBIN TIME: NORMAL

## 2024-12-18 PROCEDURE — 85610 PROTHROMBIN TIME: CPT | Mod: QW

## 2024-12-18 PROCEDURE — 99211 OFF/OP EST MAY X REQ PHY/QHP: CPT | Performed by: INTERNAL MEDICINE

## 2024-12-18 NOTE — PROGRESS NOTES
Patient identification verified with 2 identifiers.    Location: [facility] - 1st Floor Anticoagulation Clinic [address] [id number]    Referring Physician: DR Hollins  Enrollment/ Re-enrollment date:    INR Goal: 2.0-3.0  INR monitoring is per [facility] protocol.  Anticoagulation Medication: warfarin  Indication: Deep Vein Thrombosis (DVT)    Subjective   Bleeding signs/symptoms: No    Bruising: No   Major bleeding event: No  Thrombosis signs/symptoms: No  Thromboembolic event: No  Missed doses: No  Extra doses: No  Medication changes: No  Dietary changes: No  Change in health: No  Change in activity: No  Alcohol: No  Other concerns: No    Upcoming Procedures:  Does the Patient Have any upcoming procedures that require interruption in anticoagulation therapy? no  Does the patient require bridging? no      Anticoagulation Summary  As of 2024      INR goal:  2.0-3.0   TTR:  92.3% (1.2 y)   INR used for dosin.20 (2024)   Weekly warfarin total:  38 mg               Assessment/Plan   Therapeutic     1. New dose: no change    2. Next INR: 1 month      Education provided to patient during the visit:  Patient instructed to call in interim with questions, concerns and changes.

## 2025-01-29 ENCOUNTER — ANTICOAGULATION - WARFARIN VISIT (OUTPATIENT)
Dept: CARDIOLOGY | Facility: HOSPITAL | Age: 65
End: 2025-01-29
Payer: MEDICARE

## 2025-01-29 DIAGNOSIS — I82.4Y9 DEEP VEIN THROMBOSIS (DVT) OF PROXIMAL LOWER EXTREMITY, UNSPECIFIED CHRONICITY, UNSPECIFIED LATERALITY (MULTI): Primary | ICD-10-CM

## 2025-01-29 LAB
POC INR: 2.5
POC PROTHROMBIN TIME: NORMAL

## 2025-01-29 PROCEDURE — 85610 PROTHROMBIN TIME: CPT | Mod: QW

## 2025-01-29 PROCEDURE — 99211 OFF/OP EST MAY X REQ PHY/QHP: CPT | Performed by: INTERNAL MEDICINE

## 2025-01-29 NOTE — PROGRESS NOTES
Patient identification verified with 2 identifiers.    Location: [facility] - 1st Floor Anticoagulation Clinic [address] [id number]    Referring Physician: DR Hollins  Enrollment/ Re-enrollment date:    INR Goal: 2.0-3.0  INR monitoring is per [facility] protocol.  Anticoagulation Medication: warfarin  Indication: Deep Vein Thrombosis (DVT)    Subjective   Bleeding signs/symptoms: No    Bruising: No   Major bleeding event: No  Thrombosis signs/symptoms: No  Thromboembolic event: No  Missed doses: No  Extra doses: No  Medication changes: No  Dietary changes: No  Change in health: No  Change in activity: No  Alcohol: No  Other concerns: No    Upcoming Procedures:  Does the Patient Have any upcoming procedures that require interruption in anticoagulation therapy? no  Does the patient require bridging? no      Anticoagulation Summary  As of 2025      INR goal:  2.0-3.0   TTR:  93.0% (1.3 y)   INR used for dosin.50 (2025)   Weekly warfarin total:  38 mg               Assessment/Plan   Therapeutic     1. New dose: no change    2. Next INR: 1 month      Education provided to patient during the visit:  Patient instructed to call in interim with questions, concerns and changes.

## 2025-03-12 ENCOUNTER — APPOINTMENT (OUTPATIENT)
Dept: CARDIOLOGY | Facility: HOSPITAL | Age: 65
End: 2025-03-12
Payer: MEDICARE

## 2025-03-12 ENCOUNTER — ANTICOAGULATION - WARFARIN VISIT (OUTPATIENT)
Dept: CARDIOLOGY | Facility: HOSPITAL | Age: 65
End: 2025-03-12
Payer: MEDICARE

## 2025-03-12 DIAGNOSIS — I82.4Y9 DEEP VEIN THROMBOSIS (DVT) OF PROXIMAL LOWER EXTREMITY, UNSPECIFIED CHRONICITY, UNSPECIFIED LATERALITY (MULTI): Primary | ICD-10-CM

## 2025-03-12 LAB
POC INR: 2.9
POC PROTHROMBIN TIME: NORMAL

## 2025-03-12 PROCEDURE — 99211 OFF/OP EST MAY X REQ PHY/QHP: CPT | Performed by: INTERNAL MEDICINE

## 2025-03-12 PROCEDURE — 85610 PROTHROMBIN TIME: CPT | Mod: QW

## 2025-03-12 NOTE — PROGRESS NOTES
Patient identification verified with 2 identifiers.    Location: [facility] - 1st Floor Anticoagulation Clinic [address] [phone]    Referring Physician: Dr Hollins  Enrollment/ Re-enrollment date:    INR Goal: 2.0-3.0  INR monitoring is per [facility] protocol.  Anticoagulation Medication: warfarin  Indication: Atrial Fibrillation/Atrial Flutter    Subjective   Bleeding signs/symptoms: No    Bruising: No   Major bleeding event: No  Thrombosis signs/symptoms: No  Thromboembolic event: No  Missed doses: No  Extra doses: No  Medication changes: No  Dietary changes: No  Change in health: No  Change in activity: No  Alcohol: No  Other concerns: No    Upcoming Procedures:  Does the Patient Have any upcoming procedures that require interruption in anticoagulation therapy? no  Does the patient require bridging? no      Anticoagulation Summary  As of 3/12/2025      INR goal:  2.0-3.0   TTR:  93.5% (1.4 y)   INR used for dosin.90 (3/12/2025)   Weekly warfarin total:  38 mg               Assessment/Plan   Therapeutic     1. New dose: no change    2. Next INR: 1 month      Education provided to patient during the visit:  Patient instructed to call in interim with questions, concerns and changes.

## 2025-04-23 ENCOUNTER — ANTICOAGULATION - WARFARIN VISIT (OUTPATIENT)
Dept: CARDIOLOGY | Facility: HOSPITAL | Age: 65
End: 2025-04-23
Payer: MEDICARE

## 2025-04-23 DIAGNOSIS — I82.4Y9 DEEP VEIN THROMBOSIS (DVT) OF PROXIMAL LOWER EXTREMITY, UNSPECIFIED CHRONICITY, UNSPECIFIED LATERALITY: Primary | ICD-10-CM

## 2025-04-23 LAB
POC INR: 2.5 (ref 0.9–1.1)
POC PROTHROMBIN TIME: ABNORMAL (ref 9.3–12.5)

## 2025-04-23 PROCEDURE — 99211 OFF/OP EST MAY X REQ PHY/QHP: CPT | Performed by: INTERNAL MEDICINE

## 2025-04-23 PROCEDURE — 85610 PROTHROMBIN TIME: CPT | Mod: QW

## 2025-04-23 NOTE — PROGRESS NOTES
Patient identification verified with 2 identifiers.    Location: [facility] - 1st Floor Anticoagulation Clinic [address] [id number]    Referring Physician: DR Hollins  Enrollment/ Re-enrollment date: 2025   INR Goal: 2.0-3.0  INR monitoring is per [facility] protocol.  Anticoagulation Medication: warfarin  Indication: Deep Vein Thrombosis (DVT)    Subjective   Bleeding signs/symptoms:      Bruising:     Major bleeding event:    Thrombosis signs/symptoms:    Thromboembolic event:    Missed doses:    Extra doses:    Medication changes:    Dietary changes:    Change in health:    Change in activity:    Alcohol:    Other concerns:      Upcoming Procedures:  Does the Patient Have any upcoming procedures that require interruption in anticoagulation therapy? no  Does the patient require bridging? no      Anticoagulation Summary  As of 4/23/2025      INR goal:  2.0-3.0   TTR:  93.5% (1.4 y)   INR used for dosing:  --               Assessment/Plan   Therapeutic     1. New dose: no change    2. Next INR: 1 month      Education provided to patient during the visit:  Patient instructed to call in interim with questions, concerns and changes.

## 2025-05-18 ASSESSMENT — ENCOUNTER SYMPTOMS
CONSTITUTIONAL NEGATIVE: 1
LOSS OF SENSATION IN FEET: 0
DEPRESSION: 0
OCCASIONAL FEELINGS OF UNSTEADINESS: 0

## 2025-05-18 ASSESSMENT — ACTIVITIES OF DAILY LIVING (ADL)
TAKING_MEDICATION: INDEPENDENT
BATHING: INDEPENDENT
GROCERY_SHOPPING: INDEPENDENT
DRESSING: INDEPENDENT
DOING_HOUSEWORK: INDEPENDENT
MANAGING_FINANCES: INDEPENDENT

## 2025-05-18 NOTE — PROGRESS NOTES
Patient ID: Fernando Nation is a 64 y.o. male who presents for Medicare Annual Wellness Visit Subsequent and Follow-up (CPE).    /79 (BP Location: Left arm, Patient Position: Sitting, BP Cuff Size: Large adult)   Pulse 91   Wt 144 kg (317 lb 9.6 oz)   SpO2 95%   BMI 48.29 kg/m²     HPI        I REVIEWED RECORD FROM COAGULATION CLINIC         ATRIAL FIBRILLATION   NO SYNCOPE , OCCASIONALLY   FEELING DIZZY , NO LIGHTHEADEDNESS  NO PALPITATION , NO HEADACHE   ON WARFARIN  4MG  ( 2MG X2)EVERY SUNDAY , THURSDAY   6MG (2MG X3) ALL OTHER DAYS   NEXT INR 12/18/2024   RECENT INR ON 11/13/2024 2.8   HE WAS GETTING HIS INR BLOOD DRAW  AT [facility]            CARDIOMYOPATHY STABLE   NO CP, SOB , NO PND , NO ORTHOPNEA  NO SYNCOPE   HE SEE CARDIOLOGY AT Psychiatric   HCM ( MY BPC3 gene)         IMPLANTABLE CARDIODEFIBRILLATOR      PERSONAL HX OF  DVT   ON WARFARIN         OBSTRUCTIVE SLEEP APNEA   NOT USING EITHER CPAP/BIPAP  SINCE  THIS MAKES HER UNCOMFORTABLE           CKD STAGE 3B       MORBIDLY OBESE       PT HAS DIABETES     DIABETES CONTROLLED WITH DIET   NO TINGLING IN FEET   NO NUMBNESS IN FEET   NO BLURRY VISION   NO DOUBLE VISION        Subjective     Review of Systems   Constitutional: Negative.    All other systems reviewed and are negative.      Objective     Physical Exam  Vitals and nursing note reviewed.   Constitutional:       Appearance: Normal appearance. He is obese.   Neck:      Vascular: No carotid bruit.   Cardiovascular:      Rate and Rhythm: Normal rate. Rhythm irregular.      Pulses: Normal pulses.      Heart sounds: Normal heart sounds. No murmur heard.  Pulmonary:      Effort: Pulmonary effort is normal.      Breath sounds: Normal breath sounds.   Abdominal:      Palpations: There is no mass.   Musculoskeletal:      Right lower leg: No edema.      Left lower leg: No edema.   Skin:     Capillary Refill: Capillary refill takes more than 3 seconds.   Neurological:      General:  No focal deficit present.      Mental Status: He is oriented to person, place, and time. Mental status is at baseline.   Psychiatric:         Mood and Affect: Mood normal.         Behavior: Behavior normal.         Thought Content: Thought content normal.         Judgment: Judgment normal.         Lab Results   Component Value Date    WBC 3.8 (L) 05/03/2024    HGB 15.0 05/03/2024    HCT 47.6 05/03/2024    MCV 93 05/03/2024     05/03/2024           Problem List Items Addressed This Visit       Chronic diastolic heart failure    stable         Relevant Medications    semaglutide (OZEMPIC) 1 mg/dose (4 mg/3 mL) pen injector    Other Relevant Orders    Referral to Clinical Pharmacy    Atrial fibrillation (Multi)    stable         Relevant Medications    semaglutide (OZEMPIC) 1 mg/dose (4 mg/3 mL) pen injector    Other Relevant Orders    Referral to Clinical Pharmacy    Stage 3 chronic kidney disease (Multi)    stable         Relevant Medications    semaglutide (OZEMPIC) 1 mg/dose (4 mg/3 mL) pen injector    Other Relevant Orders    Referral to Clinical Pharmacy    Class 3 severe obesity due to excess calories with serious comorbidity and body mass index (BMI) of 45.0 to 49.9 in adult    Type 2 diabetes mellitus with kidney complication, without long-term current use of insulin    Relevant Medications    semaglutide (OZEMPIC) 1 mg/dose (4 mg/3 mL) pen injector    Other Relevant Orders    Albumin-Creatinine Ratio, Urine Random    Referral to Clinical Pharmacy     Other Visit Diagnoses         Medicare annual wellness visit, subsequent  (Chronic)   -  Primary      Encounter for counseling for care management of patient with chronic conditions and complex health needs using nurse-based model        Relevant Orders    Referral to Clinical Pharmacy      Screening for thyroid disorder        Relevant Orders    Tsh With Reflex To Free T4 If Abnormal      Obstructive sleep apnea        Relevant Orders    Referral to  Clinical Pharmacy                A/P       SEMAGLUTIDE 1MG ONCE WKLY FILLED   [facility]   A1C , URINE MICROALBUMIN , TSH   Discussed with the patient the effect of morbid obesity and overall all-cause mortality  Discussed with the patient the effect of morbid obesity and physical mental wellbeing  Discussed with the patient the effect of morbid obesity on cardiovascular cerebrovascular health  Discussed with the patient the effect of morbid obesity onhtn, diabetes, obstructive sleep apnea fatal arrhythmias and sudden death  Discussed with the patient the effect of morbid obesity and cancer/malignancy, depression, chronic kidney health, chronic pain  Advised him to cut back total calories intake and total portion size   Will see him in 4 months time

## 2025-05-19 ENCOUNTER — APPOINTMENT (OUTPATIENT)
Dept: PRIMARY CARE | Facility: CLINIC | Age: 65
End: 2025-05-19
Payer: MEDICARE

## 2025-05-19 VITALS
DIASTOLIC BLOOD PRESSURE: 79 MMHG | WEIGHT: 315 LBS | HEART RATE: 91 BPM | SYSTOLIC BLOOD PRESSURE: 114 MMHG | OXYGEN SATURATION: 95 % | BODY MASS INDEX: 48.29 KG/M2

## 2025-05-19 DIAGNOSIS — E66.813 CLASS 3 SEVERE OBESITY DUE TO EXCESS CALORIES WITH SERIOUS COMORBIDITY AND BODY MASS INDEX (BMI) OF 45.0 TO 49.9 IN ADULT: ICD-10-CM

## 2025-05-19 DIAGNOSIS — G47.33 OBSTRUCTIVE SLEEP APNEA: ICD-10-CM

## 2025-05-19 DIAGNOSIS — Z71.89 ENCOUNTER FOR COUNSELING FOR CARE MANAGEMENT OF PATIENT WITH CHRONIC CONDITIONS AND COMPLEX HEALTH NEEDS USING NURSE-BASED MODEL: ICD-10-CM

## 2025-05-19 DIAGNOSIS — E11.29 TYPE 2 DIABETES MELLITUS WITH OTHER DIABETIC KIDNEY COMPLICATION, WITHOUT LONG-TERM CURRENT USE OF INSULIN: ICD-10-CM

## 2025-05-19 DIAGNOSIS — I48.11 LONGSTANDING PERSISTENT ATRIAL FIBRILLATION (MULTI): ICD-10-CM

## 2025-05-19 DIAGNOSIS — N18.31 CHRONIC KIDNEY DISEASE, STAGE 3A (MULTI): ICD-10-CM

## 2025-05-19 DIAGNOSIS — N18.31 TYPE 2 DIABETES MELLITUS WITH STAGE 3A CHRONIC KIDNEY DISEASE, WITHOUT LONG-TERM CURRENT USE OF INSULIN (MULTI): ICD-10-CM

## 2025-05-19 DIAGNOSIS — I50.32 CHRONIC DIASTOLIC HEART FAILURE: ICD-10-CM

## 2025-05-19 DIAGNOSIS — I13.0 HYPERTENSIVE HEART AND CHRONIC KIDNEY DISEASE WITH HEART FAILURE AND STAGE 1 THROUGH STAGE 4 CHRONIC KIDNEY DISEASE, OR UNSPECIFIED CHRONIC KIDNEY DISEASE: ICD-10-CM

## 2025-05-19 DIAGNOSIS — I48.0 PAROXYSMAL ATRIAL FIBRILLATION (MULTI): ICD-10-CM

## 2025-05-19 DIAGNOSIS — I48.91 ATRIAL FIBRILLATION, UNSPECIFIED TYPE (MULTI): ICD-10-CM

## 2025-05-19 DIAGNOSIS — Z00.00 MEDICARE ANNUAL WELLNESS VISIT, SUBSEQUENT: Primary | Chronic | ICD-10-CM

## 2025-05-19 DIAGNOSIS — N18.30 STAGE 3 CHRONIC KIDNEY DISEASE, UNSPECIFIED WHETHER STAGE 3A OR 3B CKD (MULTI): ICD-10-CM

## 2025-05-19 DIAGNOSIS — I47.20 VT (VENTRICULAR TACHYCARDIA) (MULTI): ICD-10-CM

## 2025-05-19 DIAGNOSIS — E11.22 TYPE 2 DIABETES MELLITUS WITH STAGE 3A CHRONIC KIDNEY DISEASE, WITHOUT LONG-TERM CURRENT USE OF INSULIN (MULTI): ICD-10-CM

## 2025-05-19 DIAGNOSIS — Z13.29 SCREENING FOR THYROID DISORDER: ICD-10-CM

## 2025-05-19 PROCEDURE — 1036F TOBACCO NON-USER: CPT | Performed by: INTERNAL MEDICINE

## 2025-05-19 PROCEDURE — G0439 PPPS, SUBSEQ VISIT: HCPCS | Performed by: INTERNAL MEDICINE

## 2025-05-19 PROCEDURE — 99396 PREV VISIT EST AGE 40-64: CPT | Performed by: INTERNAL MEDICINE

## 2025-05-19 PROCEDURE — 3078F DIAST BP <80 MM HG: CPT | Performed by: INTERNAL MEDICINE

## 2025-05-19 PROCEDURE — 3074F SYST BP LT 130 MM HG: CPT | Performed by: INTERNAL MEDICINE

## 2025-05-19 ASSESSMENT — PATIENT HEALTH QUESTIONNAIRE - PHQ9
SUM OF ALL RESPONSES TO PHQ9 QUESTIONS 1 AND 2: 0
1. LITTLE INTEREST OR PLEASURE IN DOING THINGS: NOT AT ALL
2. FEELING DOWN, DEPRESSED OR HOPELESS: NOT AT ALL
SUM OF ALL RESPONSES TO PHQ9 QUESTIONS 1 AND 2: 0
1. LITTLE INTEREST OR PLEASURE IN DOING THINGS: NOT AT ALL
2. FEELING DOWN, DEPRESSED OR HOPELESS: NOT AT ALL

## 2025-05-19 ASSESSMENT — ACTIVITIES OF DAILY LIVING (ADL)
DOING_HOUSEWORK: INDEPENDENT
GROCERY_SHOPPING: INDEPENDENT
MANAGING_FINANCES: INDEPENDENT
BATHING: INDEPENDENT
DRESSING: INDEPENDENT
TAKING_MEDICATION: INDEPENDENT

## 2025-05-20 LAB
ALBUMIN/CREAT UR: 6 MG/G CREAT
CREAT UR-MCNC: 188 MG/DL (ref 20–320)
MICROALBUMIN UR-MCNC: 1.1 MG/DL
TSH SERPL-ACNC: 0.6 MIU/L (ref 0.4–4.5)

## 2025-05-28 ENCOUNTER — TELEPHONE (OUTPATIENT)
Dept: PRIMARY CARE | Facility: CLINIC | Age: 65
End: 2025-05-28
Payer: MEDICARE

## 2025-06-04 ENCOUNTER — APPOINTMENT (OUTPATIENT)
Dept: PHARMACY | Facility: HOSPITAL | Age: 65
End: 2025-06-04
Payer: MEDICARE

## 2025-06-04 ENCOUNTER — ANTICOAGULATION - WARFARIN VISIT (OUTPATIENT)
Dept: CARDIOLOGY | Facility: HOSPITAL | Age: 65
End: 2025-06-04
Payer: MEDICARE

## 2025-06-04 DIAGNOSIS — Z71.89 ENCOUNTER FOR COUNSELING FOR CARE MANAGEMENT OF PATIENT WITH CHRONIC CONDITIONS AND COMPLEX HEALTH NEEDS USING NURSE-BASED MODEL: ICD-10-CM

## 2025-06-04 DIAGNOSIS — I48.91 ATRIAL FIBRILLATION, UNSPECIFIED TYPE (MULTI): ICD-10-CM

## 2025-06-04 DIAGNOSIS — E11.29 TYPE 2 DIABETES MELLITUS WITH OTHER DIABETIC KIDNEY COMPLICATION, WITHOUT LONG-TERM CURRENT USE OF INSULIN: ICD-10-CM

## 2025-06-04 DIAGNOSIS — I82.4Y9 DEEP VEIN THROMBOSIS (DVT) OF PROXIMAL LOWER EXTREMITY, UNSPECIFIED CHRONICITY, UNSPECIFIED LATERALITY: Primary | ICD-10-CM

## 2025-06-04 DIAGNOSIS — I47.20 VT (VENTRICULAR TACHYCARDIA) (MULTI): ICD-10-CM

## 2025-06-04 DIAGNOSIS — G47.33 OBSTRUCTIVE SLEEP APNEA: ICD-10-CM

## 2025-06-04 LAB
POC INR: 2.7 (ref 0.9–1.1)
POC PROTHROMBIN TIME: ABNORMAL (ref 9.3–12.5)

## 2025-06-04 PROCEDURE — 85610 PROTHROMBIN TIME: CPT | Mod: QW

## 2025-06-04 PROCEDURE — 99211 OFF/OP EST MAY X REQ PHY/QHP: CPT | Performed by: INTERNAL MEDICINE

## 2025-06-04 NOTE — ASSESSMENT & PLAN NOTE
Patient's goal A1c is < 7.0%.  Is pt at goal? Yes, 6.0%  Patient presents to clinical pharmacy for well controlled diabetes. He has been tolerating both Ozempic and Jardiance w/o complaints of side effects. He is about to finish his 0.5 mg dose of Ozempic before starting 1 mg weekly, of which he has already picked up his script. He reports significant weight loss already. He endorses good lifestyle choices through diet and exercise as able. Does not check his blood sugars, but does occasionally feel like his sugars are running low. At this time, will continue all medications and plan on follow up in one month for Ozempic titration. Had a brief disccussion regarding potential [facility] PAP, but he is unsure if he and his wife would qualify. He will get back to me and we can proceed with application if able. Regardless, he is able to pay for his medicaitons w/o complaints.    Plan:  Start Ozempic 1 mg weekly  Continue Jardiance 10 mg daily

## 2025-06-04 NOTE — PROGRESS NOTES
Clinical Pharmacy Appointment    Patient ID: Fernando Nation is a 64 y.o. male who presents for Diabetes and Atrial Fibrillation.    Pt is here for First appointment.     Referring Provider: Devan Hollins MD  PCP: Devan Hollins MD  Last visit with PCP: 5/19/25   Next visit with PCP: 9/19/25    Subjective   HPI  DIABETES MELLITUS TYPE 2:    Mr. Nation is a pleasant 65 y/o male presenting today to establish care with clinical pharmacy. He is doing well from a diabetes standpoint. He is about to finish his final dose of Ozempic 0.5 mg weekly and has tolerated w/o complaints of side effects. He has already picked up the next dose. He endorses good lifestyle choices through diet and exercise as able. He does not check blood sugars but does feel as though it is occasionally low.     Does patient follow with Endocrinology: No     Current diabetic medications include:  Ozempic 0.5 mg weekly  Jardiance 10 mg daily    Clarifications to above regimen: none  Adverse Effects: none    Past diabetic medications include:      Secondary Prevention:  Statin? Yes  ACE-I/ARB? No  Aspirin? No    Pertinent PMH Review:  PMH of Pancreatitis: No  PMH of Retinopathy: No  PMH of Urinary Tract Infections: No  PMH of MTC: No  PMH of MEN2: No  UACR/EGFR in last year?: No  ALBUMIN/CREATININE RATIO, RANDOM URINE   Date Value Ref Range Status   05/19/2025 6 <30 mg/g creat Final     Comment:        The ADA defines abnormalities in albumin  excretion as follows:     Albuminuria Category        Result (mg/g creatinine)     Normal to Mildly increased   <30  Moderately increased            Severely increased           > OR = 300     The ADA recommends that at least two of three  specimens collected within a 3-6 month period be  abnormal before considering a patient to be  within a diagnostic category.         Immunizations:  COVID? No  Pneumonia? Yes    ATRIAL FIBRILLATION   Does patient follow with cardiology? Yes  Last cardiology  appointment: 3/31/25    Current atrial fibrillations medications include:  Rate Control: Metoprolol succinate 100 mg twice daily  Anticoagulation: Warfarin  Rhythm: amiodarone 200 mg daily    Clarifications to above regimen: none   Adverse Effects: none     Screening for dose adjustment:  Follows INR clinic every 6 weeks. Last visit this morning with no adjustments made per chart review    Diagnosis:  Patient is projected to be on anticoagulation indefinitely  [id number] Score: [4]  Age: [<65 (0)] [65-74 (+1)] [> 75 (+2)]: 0  Sex: [Male/Female (+1)]: 0  CHF history: [No/Yes(+1)]: 1  Hypertension history: [No/Yes(+1)]: 1  Stroke/TIA/thromboembolism history: [No/Yes(+2)]: 1  Vascular disease history (prior MI, peripheral artery disease, aortic plaque): [No/Yes(+1)]: 0  Diabetes history: [No/Yes(+1)]: 1    Drug Interactions  No relevant drug interactions were noted.    Medication System Management  Patient's preferred pharmacy: [facility]  Adherence/Organization: taking as prescribed  Affordability/Accessibility: able to afford    Objective   Allergies[1]  Social History     Social History Narrative    Not on file      Medication Review  Current Outpatient Medications   Medication Instructions    albuterol 90 mcg/actuation inhaler 2 puffs, inhalation, Every 6 hours PRN    allopurinol (ZYLOPRIM) 100 mg, oral, Daily    amiodarone (Pacerone) 200 mg tablet Every 24 hours    cholecalciferol, vitamin D3, 250 mcg (10,000 unit) tablet 1 tablet, 2 times daily    empagliflozin (Jardiance) 10 mg 1 tablet, Daily with breakfast    furosemide (LASIX) 40 mg, oral, Daily    lovastatin (MEVACOR) 20 mg, oral, Nightly    metoprolol succinate XL (Toprol-XL) 100 mg 24 hr tablet 1 tablet, Every 12 hours scheduled (0630,1830)    mupirocin (Bactroban) 2 % ointment APPLY TOPICALLY TO THE AFFECTED AREA THREE TIMES DAILY FOR 10 DAYS    NON FORMULARY HANDICAPPED PARKING PLACARD (5 YR) 5 YEARS    semaglutide (OZEMPIC) 1 mg, subcutaneous,  Weekly    warfarin (COUMADIN) 2 mg, oral, See admin instructions, 6 mg (three 2 mg tablets) 5 times a week with 2 mg (1 tablet) 2 times a week      Vitals  BP Readings from Last 2 Encounters:   05/19/25 114/79   11/18/24 117/78     BMI Readings from Last 1 Encounters:   05/19/25 48.29 kg/m²      Labs  A1C  Lab Results   Component Value Date    HGBA1C 6.0 (H) 11/18/2024    HGBA1C 6.1 (H) 09/12/2024    HGBA1C 6.1 (H) 05/03/2024     BMP  Lab Results   Component Value Date    CALCIUM 9.5 11/18/2024     11/18/2024    K 4.1 11/18/2024    CO2 29 11/18/2024     11/18/2024    BUN 14 11/18/2024    CREATININE 1.36 (H) 11/18/2024    EGFR 58 (L) 11/18/2024     LFTs  Lab Results   Component Value Date    ALT 18 11/18/2024    AST 17 11/18/2024    ALKPHOS 66 11/18/2024    BILITOT 1.0 11/18/2024     FLP  Lab Results   Component Value Date    TRIG 81 11/18/2024    CHOL 129 11/18/2024    LDLCALC 50 11/18/2024    HDL 62.9 11/18/2024     Urine Microalbumin  Lab Results   Component Value Date    MICROALBCREA 6 05/19/2025     Weight Management  Wt Readings from Last 3 Encounters:   05/19/25 144 kg (317 lb 9.6 oz)   11/18/24 (!) 150 kg (331 lb)   09/23/24 (!) 151 kg (333 lb)      There is no height or weight on file to calculate BMI.     Assessment/Plan   Problem List Items Addressed This Visit       Atrial fibrillation (Multi)    Patient is doing well from an atrial firbrillation standpoint. He is currently tolerating warfarin, amiodarone, and metoprolol succinate w/o complaints of side effects. He has a longstanding hx of anticoagulation, including hx of DVT and defibrillator placement. He follows closely with the INR clinic with last visit this morning at which no changes were made. He will be getting refills on his medications. Based on chart review, I do think he would be an appropriate candidate for DOAC conversion, but will defer to his cardiology team. Continue all medications.     Plan:  Continue amiodarone 200 mg  daily  Continue metoprolol succinate 100 mg twice daily  Continue warfarin as directed by  clinic          Relevant Orders    Referral to Clinical Pharmacy    Type 2 diabetes mellitus with kidney complication, without long-term current use of insulin    Patient's goal A1c is < 7.0%.  Is pt at goal? Yes, 6.0%  Patient presents to clinical pharmacy for well controlled diabetes. He has been tolerating both Ozempic and Jardiance w/o complaints of side effects. He is about to finish his 0.5 mg dose of Ozempic before starting 1 mg weekly, of which he has already picked up his script. He reports significant weight loss already. He endorses good lifestyle choices through diet and exercise as able. Does not check his blood sugars, but does occasionally feel like his sugars are running low. At this time, will continue all medications and plan on follow up in one month for Ozempic titration. Had a brief disccussion regarding potential  PAP, but he is unsure if he and his wife would qualify. He will get back to me and we can proceed with application if able. Regardless, he is able to pay for his medicaitons w/o complaints.    Plan:  Start Ozempic 1 mg weekly  Continue Jardiance 10 mg daily         Relevant Orders    Referral to Clinical Pharmacy     Other Visit Diagnoses         VT (ventricular tachycardia) (Multi)          Encounter for counseling for care management of patient with chronic conditions and complex health needs using nurse-based model          Obstructive sleep apnea            Clinical Pharmacist follow-up: 7/3/25, Telehealth visit  Continue all meds under the continuation of care with the referring provider and clinical pharmacy team.    Thank you,  Farhad Jarrell, PharmMARCIO    Meds PGY1 Resident  [phone]    Verbal consent to manage patient's drug therapy was obtained from the patient. They were informed they may decline to participate or withdraw from participation in pharmacy services at any time.          [1] No Known Allergies

## 2025-06-04 NOTE — PROGRESS NOTES
Patient identification verified with 2 identifiers.    Location: [facility] - 1st Floor Anticoagulation Clinic [address] [phone]    Referring Physician: DR Tavares  Enrollment/ Re-enrollment date:    INR Goal: 2.0-3.0  INR monitoring is per [facility] protocol.  Anticoagulation Medication: warfarin  Indication: Deep Vein Thrombosis (DVT)    Subjective   Bleeding signs/symptoms: No    Bruising: No   Major bleeding event: No  Thrombosis signs/symptoms: No  Thromboembolic event: No  Missed doses: No  Extra doses: No  Medication changes: No  Dietary changes: No  Change in health: No  Change in activity: No  Alcohol: No  Other concerns: No    Upcoming Procedures:  Does the Patient Have any upcoming procedures that require interruption in anticoagulation therapy? no  Does the patient require bridging? no      Anticoagulation Summary  As of 2025      INR goal:  2.0-3.0   TTR:  94.4% (1.7 y)   INR used for dosin.70 (2025)   Weekly warfarin total:  38 mg               Assessment/Plan   Therapeutic     1. New dose: no change    2. Next INR: 1 month      Education provided to patient during the visit:  Patient instructed to call in interim with questions, concerns and changes.

## 2025-06-04 NOTE — ASSESSMENT & PLAN NOTE
Patient is doing well from an atrial firbrillation standpoint. He is currently tolerating warfarin, amiodarone, and metoprolol succinate w/o complaints of side effects. He has a longstanding hx of anticoagulation, including hx of DVT, VT and defibrillator placement. He follows closely with the INR clinic with last visit this morning at which no changes were made. He will be getting refills on his medications. Based on chart review, I do think he would be an appropriate candidate for DOAC conversion, but will defer to his cardiology team. Continue all medications.     Plan:  Continue amiodarone 200 mg daily  Continue metoprolol succinate 100 mg twice daily  Continue warfarin as directed by AC clinic

## 2025-06-25 ENCOUNTER — TELEPHONE (OUTPATIENT)
Dept: PRIMARY CARE | Facility: CLINIC | Age: 65
End: 2025-06-25
Payer: MEDICARE

## 2025-06-26 DIAGNOSIS — G47.33 OBSTRUCTIVE SLEEP APNEA: ICD-10-CM

## 2025-06-26 DIAGNOSIS — E11.69 TYPE 2 DIABETES MELLITUS WITH OTHER SPECIFIED COMPLICATION, WITHOUT LONG-TERM CURRENT USE OF INSULIN: ICD-10-CM

## 2025-06-26 DIAGNOSIS — I50.9 CONGESTIVE HEART FAILURE, UNSPECIFIED HF CHRONICITY, UNSPECIFIED HEART FAILURE TYPE: ICD-10-CM

## 2025-06-26 DIAGNOSIS — E66.813 CLASS 3 SEVERE OBESITY DUE TO EXCESS CALORIES WITH SERIOUS COMORBIDITY AND BODY MASS INDEX (BMI) OF 45.0 TO 49.9 IN ADULT: Primary | ICD-10-CM

## 2025-07-03 ENCOUNTER — APPOINTMENT (OUTPATIENT)
Dept: PHARMACY | Facility: HOSPITAL | Age: 65
End: 2025-07-03
Payer: MEDICARE

## 2025-07-03 DIAGNOSIS — E11.29 TYPE 2 DIABETES MELLITUS WITH OTHER DIABETIC KIDNEY COMPLICATION, WITHOUT LONG-TERM CURRENT USE OF INSULIN: ICD-10-CM

## 2025-07-03 NOTE — ASSESSMENT & PLAN NOTE
Patient's goal A1c is < 7.0%.  Is pt at goal? Yes, 6.0%  Patient presents to clinical pharmacy for well controlled diabetes. He has been tolerating both Ozempic and Jardiance w/o complaints of side effects. He has taken one dose of Ozempic 2 mg and tolerated it without incidence. He reports significant weight loss already. He endorses good lifestyle choices through diet and exercise as able. Does not check his blood sugars, but does occasionally feel like his sugars are running low. Discussed  PAP and patient may be eligible. He will send his financial documents in the next week. At this time, will continue all medications and plan to follow up in three months to assess progress on Ozempic and debrief after PCP appointment. Will keep in touch regarding  PAP status and patient has my contact info for any questions.    Plan:  Continue Ozempic 2 mg weekly (Wednesday)  Continue Jardiance 10 mg daily

## 2025-07-03 NOTE — PROGRESS NOTES
Clinical Pharmacy Appointment    Patient ID: Fernando Nation is a 65 y.o. male who presents for Diabetes.    Pt is here for Follow Up appointment.     Referring Provider: Devan Hollins MD  PCP: Devan Hollins MD  Last visit with PCP: 5/19/25   Next visit with PCP: 9/19/25    Subjective   HPI  DIABETES MELLITUS TYPE 2:    Mr. Nation is a pleasant 65 y/o male presenting today to follow up with clinical pharmacy. He is doing well from a diabetes standpoint. He has taken one dose of Ozempic 2 mg weekly and has tolerated w/o complaints of side effects. He endorses good lifestyle choices through diet and exercise as able. He does not check blood sugars but does feel as though it is occasionally low.     Does patient follow with Endocrinology: No     Current diabetic medications include:  Ozempic 2 mg weekly  Jardiance 10 mg daily    Clarifications to above regimen: none  Adverse Effects: none    Secondary Prevention:  Statin? Yes  ACE-I/ARB? No  Aspirin? No    Pertinent PMH Review:  PMH of Pancreatitis: No  PMH of Retinopathy: No  PMH of Urinary Tract Infections: No  PMH of MTC: No  PMH of MEN2: No  UACR/EGFR in last year?: No  ALBUMIN/CREATININE RATIO, RANDOM URINE   Date Value Ref Range Status   05/19/2025 6 <30 mg/g creat Final     Comment:        The ADA defines abnormalities in albumin  excretion as follows:     Albuminuria Category        Result (mg/g creatinine)     Normal to Mildly increased   <30  Moderately increased            Severely increased           > OR = 300     The ADA recommends that at least two of three  specimens collected within a 3-6 month period be  abnormal before considering a patient to be  within a diagnostic category.         Immunizations:  COVID? No  Pneumonia? Yes    Drug Interactions  No relevant drug interactions were noted.    Medication System Management  Patient's preferred pharmacy: [facility]  Adherence/Organization: taking as  prescribed  Affordability/Accessibility: able to afford     Patient Assistance Program (PAP)    Application for program to be submitted for the following medications: Ozempic and Jardiance    Prescription Insurance:  Yes   County of Permanent Address:  [facility]   Members of Household:  2   Files Taxes:  Yes     Patient will email or fax income documents.    Patient verbally reports monthly or yearly income which is less than 400% federal poverty level    Patient aware this process may take up to 6 weeks.     If approved medication must be filled through [facility] and MEDICATION WILL BE MAILED TO PATIENT.    Objective   Allergies[1]  Social History     Social History Narrative    Not on file      Medication Review  Current Outpatient Medications   Medication Instructions    albuterol 90 mcg/actuation inhaler 2 puffs, inhalation, Every 6 hours PRN    amiodarone (Pacerone) 200 mg tablet Every 24 hours    cholecalciferol, vitamin D3, 250 mcg (10,000 unit) tablet 1 tablet, 2 times daily    empagliflozin (Jardiance) 10 mg 1 tablet, Daily with breakfast    furosemide (LASIX) 40 mg, oral, Daily    lovastatin (MEVACOR) 20 mg, oral, Nightly    metoprolol succinate XL (Toprol-XL) 100 mg 24 hr tablet 1 tablet, Every 12 hours scheduled (0630,1830)    NON FORMULARY HANDICAPPED PARKING PLACARD (5 YR) 5 YEARS    semaglutide 2 mg, subcutaneous, Weekly    warfarin (COUMADIN) 2 mg, oral, See admin instructions, 6 mg (three 2 mg tablets) 5 times a week with 2 mg (1 tablet) 2 times a week      Vitals  BP Readings from Last 2 Encounters:   05/19/25 114/79   11/18/24 117/78     BMI Readings from Last 1 Encounters:   05/19/25 48.29 kg/m²      Labs  A1C  Lab Results   Component Value Date    HGBA1C 6.0 (H) 11/18/2024    HGBA1C 6.1 (H) 09/12/2024    HGBA1C 6.1 (H) 05/03/2024     BMP  Lab Results   Component Value Date    CALCIUM 9.5 11/18/2024     11/18/2024    K 4.1 11/18/2024    CO2 29 11/18/2024     11/18/2024     BUN 14 11/18/2024    CREATININE 1.36 (H) 11/18/2024    EGFR 58 (L) 11/18/2024     LFTs  Lab Results   Component Value Date    ALT 18 11/18/2024    AST 17 11/18/2024    ALKPHOS 66 11/18/2024    BILITOT 1.0 11/18/2024     FLP  Lab Results   Component Value Date    TRIG 81 11/18/2024    CHOL 129 11/18/2024    LDLCALC 50 11/18/2024    HDL 62.9 11/18/2024     Urine Microalbumin  Lab Results   Component Value Date    MICROALBCREA 6 05/19/2025     Weight Management  Wt Readings from Last 3 Encounters:   05/19/25 144 kg (317 lb 9.6 oz)   11/18/24 (!) 150 kg (331 lb)   09/23/24 (!) 151 kg (333 lb)      There is no height or weight on file to calculate BMI.     Assessment/Plan   Problem List Items Addressed This Visit       Type 2 diabetes mellitus with kidney complication, without long-term current use of insulin    Patient's goal A1c is < 7.0%.  Is pt at goal? Yes, 6.0%  Patient presents to clinical pharmacy for well controlled diabetes. He has been tolerating both Ozempic and Jardiance w/o complaints of side effects. He has taken one dose of Ozempic 2 mg and tolerated it without incidence. He reports significant weight loss already. He endorses good lifestyle choices through diet and exercise as able. Does not check his blood sugars, but does occasionally feel like his sugars are running low. Discussed  PAP and patient may be eligible. He will send his financial documents in the next week. At this time, will continue all medications and plan to follow up in three months to assess progress on Ozempic and debrief after PCP appointment. Will keep in touch regarding  PAP status and patient has my contact info for any questions.    Plan:  Continue Ozempic 2 mg weekly (Wednesday)  Continue Jardiance 10 mg daily         Relevant Orders    Referral to Clinical Pharmacy     Clinical Pharmacist follow-up: 10/2/25, Telehealth visit  Continue all meds under the continuation of care with the referring provider and clinical pharmacy  team.    Thank you,  Maira Blackburn  Clinical Pharmacist  [phone]    Verbal consent to manage patient's drug therapy was obtained from the patient. They were informed they may decline to participate or withdraw from participation in pharmacy services at any time.       [1] No Known Allergies

## 2025-07-16 ENCOUNTER — ANTICOAGULATION - WARFARIN VISIT (OUTPATIENT)
Dept: CARDIOLOGY | Facility: HOSPITAL | Age: 65
End: 2025-07-16
Payer: MEDICARE

## 2025-07-16 DIAGNOSIS — I82.4Y9 DEEP VEIN THROMBOSIS (DVT) OF PROXIMAL LOWER EXTREMITY, UNSPECIFIED CHRONICITY, UNSPECIFIED LATERALITY: Primary | ICD-10-CM

## 2025-07-16 LAB
POC INR: 2.4 (ref 0.9–1.1)
POC PROTHROMBIN TIME: ABNORMAL (ref 9.3–12.5)

## 2025-07-16 PROCEDURE — 99211 OFF/OP EST MAY X REQ PHY/QHP: CPT | Performed by: INTERNAL MEDICINE

## 2025-07-16 PROCEDURE — 85610 PROTHROMBIN TIME: CPT | Mod: QW

## 2025-07-16 NOTE — PROGRESS NOTES
Patient identification verified with 2 identifiers.    Location: [facility] - [facility]st Floor [facility]tico[facility] [address] [id number]    Referring Physician: DR Hollins  Enrollment/ Re-enrollment date: 2025   INR Goal: 2.0-3.0  INR monitoring is per [facility] protocol.  Anticoagulation Medication: warfarin  Indication: Atrial Fibrillation/Atrial Flutter    Subjective   Bleeding signs/symptoms:      Bruising:     Major bleeding event:    Thrombosis signs/symptoms:    Thromboembolic event:    Missed doses:    Extra doses:    Medication changes:    Dietary changes:    Change in health:    Change in activity:    Alcohol:    Other concerns:      Upcoming Procedures:  Does the Patient Have any upcoming procedures that require interruption in anticoagulation therapy? no  Does the patient require bridging? no      Anticoagulation Summary  As of 7/16/2025      INR goal:  2.0-3.0   TTR:  94.4% (1.7 y)   INR used for dosing:  --               Assessment/Plan   Therapeutic     1. New dose: no change    2. Next INR: 1 month      Education provided to patient during the visit:  Patient instructed to call in interim with questions, concerns and changes.

## 2025-08-27 ENCOUNTER — ANTICOAGULATION - WARFARIN VISIT (OUTPATIENT)
Dept: CARDIOLOGY | Facility: HOSPITAL | Age: 65
End: 2025-08-27
Payer: MEDICARE

## 2025-08-27 DIAGNOSIS — I48.11 LONGSTANDING PERSISTENT ATRIAL FIBRILLATION (MULTI): Primary | ICD-10-CM

## 2025-08-27 DIAGNOSIS — I82.4Y9 DEEP VEIN THROMBOSIS (DVT) OF PROXIMAL LOWER EXTREMITY, UNSPECIFIED CHRONICITY, UNSPECIFIED LATERALITY: Primary | ICD-10-CM

## 2025-08-27 LAB
POC INR: 2.6 (ref 0.9–1.1)
POC PROTHROMBIN TIME: ABNORMAL (ref 9.3–12.5)

## 2025-08-27 PROCEDURE — 85610 PROTHROMBIN TIME: CPT | Mod: QW

## 2025-08-27 PROCEDURE — 99211 OFF/OP EST MAY X REQ PHY/QHP: CPT | Performed by: INTERNAL MEDICINE

## 2025-09-19 ENCOUNTER — APPOINTMENT (OUTPATIENT)
Dept: PRIMARY CARE | Facility: CLINIC | Age: 65
End: 2025-09-19
Payer: MEDICARE

## 2025-10-02 ENCOUNTER — APPOINTMENT (OUTPATIENT)
Dept: PHARMACY | Facility: HOSPITAL | Age: 65
End: 2025-10-02
Payer: MEDICARE